# Patient Record
Sex: FEMALE
[De-identification: names, ages, dates, MRNs, and addresses within clinical notes are randomized per-mention and may not be internally consistent; named-entity substitution may affect disease eponyms.]

---

## 2017-09-15 ENCOUNTER — HOSPITAL ENCOUNTER (OUTPATIENT)
Dept: HOSPITAL 42 - ENDO | Age: 82
Discharge: HOME | End: 2017-09-15
Attending: INTERNAL MEDICINE
Payer: MEDICARE

## 2017-09-15 VITALS
HEART RATE: 74 BPM | OXYGEN SATURATION: 99 % | SYSTOLIC BLOOD PRESSURE: 138 MMHG | TEMPERATURE: 98 F | DIASTOLIC BLOOD PRESSURE: 75 MMHG

## 2017-09-15 VITALS — RESPIRATION RATE: 18 BRPM

## 2017-09-15 VITALS — BODY MASS INDEX: 36.6 KG/M2

## 2017-09-15 DIAGNOSIS — K60.2: ICD-10-CM

## 2017-09-15 DIAGNOSIS — D12.3: ICD-10-CM

## 2017-09-15 DIAGNOSIS — K64.4: ICD-10-CM

## 2017-09-15 DIAGNOSIS — K57.30: ICD-10-CM

## 2017-09-15 DIAGNOSIS — Z80.0: ICD-10-CM

## 2017-09-15 DIAGNOSIS — K64.8: ICD-10-CM

## 2017-09-15 DIAGNOSIS — D12.2: Primary | ICD-10-CM

## 2017-09-15 PROCEDURE — 45385 COLONOSCOPY W/LESION REMOVAL: CPT

## 2017-09-15 PROCEDURE — 88305 TISSUE EXAM BY PATHOLOGIST: CPT

## 2017-09-15 PROCEDURE — 45380 COLONOSCOPY AND BIOPSY: CPT

## 2017-10-21 ENCOUNTER — HOSPITAL ENCOUNTER (EMERGENCY)
Dept: HOSPITAL 42 - ED | Age: 82
Discharge: HOME | End: 2017-10-21
Payer: MEDICARE

## 2017-10-21 VITALS — RESPIRATION RATE: 19 BRPM

## 2017-10-21 VITALS
TEMPERATURE: 98.2 F | DIASTOLIC BLOOD PRESSURE: 66 MMHG | OXYGEN SATURATION: 98 % | SYSTOLIC BLOOD PRESSURE: 136 MMHG | HEART RATE: 81 BPM

## 2017-10-21 VITALS — BODY MASS INDEX: 36.6 KG/M2

## 2017-10-21 DIAGNOSIS — N30.00: Primary | ICD-10-CM

## 2017-10-21 DIAGNOSIS — D64.9: ICD-10-CM

## 2017-10-21 LAB
ALBUMIN/GLOB SERPL: 1.3 {RATIO} (ref 1.1–1.8)
ALP SERPL-CCNC: 67 U/L (ref 38–126)
ALT SERPL-CCNC: 24 U/L (ref 7–56)
AMORPH SED URNS QL MICRO: (no result)
APPEARANCE UR: (no result)
APTT BLD: 19.2 SECONDS (ref 25.1–36.5)
AST SERPL-CCNC: 36 U/L (ref 14–36)
BACTERIA #/AREA URNS HPF: (no result) /[HPF]
BASOPHILS # BLD AUTO: 0.04 K/MM3 (ref 0–2)
BASOPHILS NFR BLD: 0.6 % (ref 0–3)
BILIRUB SERPL-MCNC: 0.5 MG/DL (ref 0.2–1.3)
BILIRUB UR-MCNC: NEGATIVE MG/DL
BUN SERPL-MCNC: 22 MG/DL (ref 7–21)
CALCIUM SERPL-MCNC: 9 MG/DL (ref 8.4–10.5)
CHLORIDE SERPL-SCNC: 100 MMOL/L (ref 98–107)
CO2 SERPL-SCNC: 25 MMOL/L (ref 21–33)
EOSINOPHIL # BLD: 0.2 10*3/UL (ref 0–0.7)
EOSINOPHIL NFR BLD: 3.8 % (ref 1.5–5)
EPI CELLS #/AREA URNS HPF: (no result) /HPF (ref 0–5)
ERYTHROCYTE [DISTWIDTH] IN BLOOD BY AUTOMATED COUNT: 15.1 % (ref 11.5–14.5)
GLOBULIN SER-MCNC: 3.2 GM/DL
GLUCOSE SERPL-MCNC: 198 MG/DL (ref 70–110)
GLUCOSE UR STRIP-MCNC: NEGATIVE MG/DL
GRANULOCYTES # BLD: 4.53 10*3/UL (ref 1.4–6.5)
GRANULOCYTES NFR BLD: 70.7 % (ref 50–68)
HCT VFR BLD CALC: 31.1 % (ref 36–48)
INR PPP: 1.05 (ref 0.93–1.08)
KETONES UR STRIP-MCNC: NEGATIVE MG/DL
LEUKOCYTE ESTERASE UR-ACNC: (no result) LEU/UL
LYMPHOCYTES # BLD: 1.1 10*3/UL (ref 1.2–3.4)
LYMPHOCYTES NFR BLD AUTO: 16.9 % (ref 22–35)
MCH RBC QN AUTO: 30.8 PG (ref 25–35)
MCHC RBC AUTO-ENTMCNC: 33.4 G/DL (ref 31–37)
MCV RBC AUTO: 92 FL (ref 80–105)
MONOCYTES # BLD AUTO: 0.5 10*3/UL (ref 0.1–0.6)
MONOCYTES NFR BLD: 8 % (ref 1–6)
PH UR STRIP: 6 [PH] (ref 4.7–8)
PLATELET # BLD: 134 10^3/UL (ref 120–450)
PMV BLD AUTO: 11.9 FL (ref 7–11)
POTASSIUM SERPL-SCNC: 3.5 MMOL/L (ref 3.6–5)
PROT SERPL-MCNC: 7.4 G/DL (ref 5.8–8.3)
PROT UR STRIP-MCNC: 30 MG/DL
RBC # UR STRIP: (no result) /UL
SODIUM SERPL-SCNC: 141 MMOL/L (ref 132–148)
SP GR UR STRIP: 1.02 (ref 1–1.03)
TROPONIN I SERPL-MCNC: 0.02 NG/ML
UROBILINOGEN UR STRIP-ACNC: 0.2 E.U./DL
WBC # BLD AUTO: 6.4 10^3/UL (ref 4.5–11)

## 2017-10-21 PROCEDURE — 96367 TX/PROPH/DG ADDL SEQ IV INF: CPT

## 2017-10-21 PROCEDURE — 85730 THROMBOPLASTIN TIME PARTIAL: CPT

## 2017-10-21 PROCEDURE — 83615 LACTATE (LD) (LDH) ENZYME: CPT

## 2017-10-21 PROCEDURE — 71010: CPT

## 2017-10-21 PROCEDURE — 85025 COMPLETE CBC W/AUTO DIFF WBC: CPT

## 2017-10-21 PROCEDURE — 84484 ASSAY OF TROPONIN QUANT: CPT

## 2017-10-21 PROCEDURE — 96366 THER/PROPH/DIAG IV INF ADDON: CPT

## 2017-10-21 PROCEDURE — 82550 ASSAY OF CK (CPK): CPT

## 2017-10-21 PROCEDURE — 80053 COMPREHEN METABOLIC PANEL: CPT

## 2017-10-21 PROCEDURE — 87086 URINE CULTURE/COLONY COUNT: CPT

## 2017-10-21 PROCEDURE — 83880 ASSAY OF NATRIURETIC PEPTIDE: CPT

## 2017-10-21 PROCEDURE — 93005 ELECTROCARDIOGRAM TRACING: CPT

## 2017-10-21 PROCEDURE — 81001 URINALYSIS AUTO W/SCOPE: CPT

## 2017-10-21 PROCEDURE — 96365 THER/PROPH/DIAG IV INF INIT: CPT

## 2017-10-21 PROCEDURE — 99285 EMERGENCY DEPT VISIT HI MDM: CPT

## 2017-10-21 PROCEDURE — 85610 PROTHROMBIN TIME: CPT

## 2017-10-21 NOTE — ED PDOC
Arrival/HPI





- General


Chief Complaint: Female Genitourinary


Time Seen by Provider: 10/21/17 17:05


Historian: Patient, Family (daughter)





- History of Present Illness


Narrative History of Present Illness (Text): 





10/21/17 17:46


Stella Ibanez is a 81 year old female, whose past medical history includes 

hypertension, CHF, Hemorrhoids, CVA, Diabetes, and lower pedal edema presents 

to the emergency room with daughter c/o rectal bleeding in the morning, and 2 

days history MELCHOR and SOB.


Patient stated she has seen Dr. Liao, and DR. Ramirez for hemorrhoids, but 

she was told no intervention needed at that time.   Patient has an appointment 

to see Dr. Jurado General surgeon for October 30th.


Denies CP, dizziness, hemoptysis, melena, n/v/d, or abnormal gait.  


Time/Duration: Other (See HPI)


Context: Home





Past Medical History





- Provider Review


Nursing Documentation Reviewed: Yes





- Past History


Past History: Non-Contributing





- Infectious Disease


Hx of Infectious Diseases: None





- Tetanus Immunization


Tetanus Immunization: Unknown





- Reproductive


Menopause: Yes





- Past Medical History


Past Medical History: No Previous





- Cardiac


Hx Pacemaker: No





- Pulmonary


Hx Respiratory Disorders: Yes


Hx Bronchitis: Yes (dx 2-21-17 given medications)





- Neurological


Hx Paralysis: No





- HEENT


Hx HEENT Disorder: Yes (uses glasses)


Hx Cataracts: Yes (sx)


Hx Glaucoma: Yes





- Renal


Hx Renal Disorder: No





- Endocrine/Metabolic


Hx Diabetes Mellitus Type 1: Yes


Hx Hypothyroidism: Yes





- Hematological/Oncological


Hx Blood Transfusions: No





- Integumentary


Hx Dermatological Disorder: No





- Musculoskeletal/Rheumatological


Hx Musculoskeletal Disorders: Yes





- Gastrointestinal


Hx Gastrointestinal Disorders: Yes (IRRITABLE BOWEL SYNDROME,H/O CHOLECYSTECTOMY

)





- Genitourinary/Gynecological


Hx Genitourinary Disorders: Yes (frequent urination)





- Psychiatric


Hx Emotional Abuse: No


Hx Physical Abuse: No


Hx Substance Use: No





- Past Surgical History


Past Surgical History: Unable to Obtain





- Surgical History


Hx Appendectomy: Yes


Hx Cholecystectomy: Yes





- Anesthesia


Hx Anesthesia: Yes


Hx Anesthesia Reactions: No


Hx Malignant Hyperthermia: No





- Suicidal Assessment


Feels Threatened In Home Enviroment: No





Family/Social History





- Physician Review


Nursing Documentation Reviewed: Yes


Family/Social History: Other (Noncontributory)


Smoking Status: Never Smoked


Hx Alcohol Use: No


Hx Substance Use: No


Hx Substance Use Treatment: No





Allergies/Home Meds


Allergies/Adverse Reactions: 


Allergies





hydromorphone HCl [From Dilaudid] Allergy (Severe, Verified 10/21/17 17:35)


 HALLUCINATIONS


tramadol Allergy (Severe, Verified 08/25/17 10:38)


 RASH








Home Medications: 


 Home Meds











 Medication  Instructions  Recorded  Confirmed


 


Clopidogrel [Plavix] 75 mg PO DAILY 07/05/16 10/21/17


 


Folic Acid 1 mg PO DAILY 07/05/16 10/21/17


 


Furosemide [Lasix] 40 mg PO TID 07/05/16 10/21/17


 


Meclizine [Meclizine*] 25 mg PO BID PRN 07/05/16 10/21/17


 


metFORMIN [glucOPHAGE] 1,000 mg PO BID 07/05/16 10/21/17


 


Levothyroxine [Synthroid] 25 mcg PO DAILY 02/21/17 10/21/17


 


Cyanocobalamin [Vitamin B12 1000 1,000 mcg PO DAILY 08/25/17 10/21/17





mcg Tab]   


 


Glimepiride [amaRYL] 4 mg PO BID 08/25/17 10/21/17


 


Hydrocortisone [Procto-Med Hc] 30 gm RC BID 08/25/17 10/21/17


 


Omega-3-Acid Ethyl Esters [OMEGA 3] 1 tab PO BID 08/25/17 10/21/17














Review of Systems





- Review of Systems


Constitutional: Normal.  absent: Fatigue, Weight Change, Fevers


Eyes: Normal


ENT: Normal.  absent: Sore Throat, Rhinorrhea


Respiratory: SOB.  absent: Cough, Sputum, Wheezing


Cardiovascular: Edema (chronic b/l lower leg edema), MELCHOR.  absent: Chest Pain, 

Palpitations, Calf Pain, Orthopnea, Syncope


Gastrointestinal: Other (rectal bleeding).  absent: Abdominal Pain, Nausea, 

Vomiting


Genitourinary Female: Normal.  absent: Dysuria, Frequency, Hematuria


Musculoskeletal: Normal


Skin: Normal.  absent: Rash


Neurological: Normal.  absent: Headache, Dizziness, Focal Weakness


Endocrine: Normal


Hemo/Lymphatic: Normal


Psychiatric: Normal





Physical Exam


Vital Signs











  Temp Pulse Resp BP Pulse Ox


 


 10/21/17 19:25  99.1 F    


 


 10/21/17 19:09   86  19  152/70 H  99


 


 10/21/17 17:06  99.1 F  88  19  137/60  98











Temperature: Afebrile


Blood Pressure: Normal


Pulse: Regular


Respiratory Rate: Normal


Appearance: Positive for: Well-Appearing, Non-Toxic, Comfortable


Pain Distress: None


Mental Status: Positive for: Alert and Oriented X 3





- Systems Exam


Head: Present: Atraumatic, Normocephalic


Pupils: Present: PERRL


Extroacular Muscles: Present: EOMI


Conjunctiva: Present: Normal


Mouth: Present: Moist Mucous Membranes


Neck: Present: Normal Range of Motion


Respiratory/Chest: Present: Clear to Auscultation, Good Air Exchange.  No: 

Respiratory Distress, Accessory Muscle Use, Wheezes, Retracting, Rhonchi


Cardiovascular: Present: Regular Rate and Rhythm, Normal S1, S2.  No: Murmurs


Abdomen: Present: Normal Bowel Sounds.  No: Tenderness, Distention, Peritoneal 

Signs


Rectal: Present: Hemorrhoids (with superficial abrasion), Normal Rectal Tone, 

Other (GUAIC negative with positive control).  No: Occult Blood, Rectal 

Tenderness, Gross Blood, Melena, Nodule/Mass/Lesions


Back: Present: Normal Inspection.  No: CVA Tenderness


Upper Extremity: Present: Normal Inspection.  No: Cyanosis, Edema


Lower Extremity: Present: Normal Inspection, NORMAL PULSES, Normal ROM, Narda's 

Sign, Neurovascularly Intact, Capillary Refill < 2 s.  No: Edema, CALF 

TENDERNESS


Neurological: Present: GCS=15, CN II-XII Intact, Speech Normal


Skin: Present: Warm, Dry, Normal Color.  No: Rashes


Psychiatric: Present: Alert, Oriented x 3, Normal Insight, Normal Concentration





Medical Decision Making


ED Course and Treatment: 





10/21/17 20:05


Patient came c/o one episode of rectal bleeding, bright red blood.  She also 

noted increased sob x 2 days.  Patient remained stable during the course of ED 

visit.  VS are WNL.  Labs were unremarkable, except for UTI.   Patient was 

treated with rocephine and fluids.


10/21/17 20:07


Dr. Stein reviewed labs, CXR, and he examined patient.  Dr. Stein agreed 

with my plan to d/c home, and Rocephin IV.


I paged Dr. Reilly.


10/21/17 20:39


I spoke with Dr. Reilly regarding patient history of rectal bleeding, and SOB.  

I reviewed labs with Dr. Reilly.  He stated he scheduled to see patient in 1-2 

days.  He agrees with plan for d/c home.


Patient feels well in her normal state of health.  She agrees with the plan for 

d/c home.


Re-evaluation Time: 20:45


Reassessment Condition: Re-examined, Improved





- Lab Interpretations


Lab Results: 








 10/21/17 18:00 





 10/21/17 18:00 





 Lab Results





10/21/17 18:59: Urine Color yellow, Urine Appearance Slight-cloudy, Urine pH 6.0

, Ur Specific Gravity 1.025, Urine Protein 30 H, Urine Glucose (UA) Negative, 

Urine Ketones Negative, Urine Blood Trace-intact H, Urine Nitrate Positive H, 

Urine Bilirubin Negative, Urine Urobilinogen 0.2, Ur Leukocyte Esterase 

Moderate H, Urine RBC 2 - 5, Urine WBC 10 - 15, Ur Epithelial Cells Many, 

Amorphous Sediment Moderate, Urine Bacteria Small


10/21/17 18:00: Sodium 141, Potassium 3.5 L, Chloride 100, Carbon Dioxide 25, 

Anion Gap 20, BUN 22 H, Creatinine 0.9, Est GFR (African Amer) > 60, Est GFR (

Non-Af Amer) > 60, Random Glucose 198 H, Calcium 9.0, Total Bilirubin 0.5, AST 

36, ALT 24, Alkaline Phosphatase 67, Lactate Dehydrogenase 495, Total Creatine 

Kinase 36, Troponin I 0.02, NT-Pro-B Natriuret Pep 211, Total Protein 7.4, 

Albumin 4.2, Globulin 3.2, Albumin/Globulin Ratio 1.3


10/21/17 18:00: PT 11.5, INR 1.05, APTT 19.2 L


10/21/17 18:00: WBC 6.4, RBC 3.38 L, Hgb 10.4 L, Hct 31.1 L, MCV 92.0, MCH 30.8

, MCHC 33.4, RDW 15.1 H, Plt Count 134, MPV 11.9 H, Gran % 70.7 H, Lymph % (Auto

) 16.9 L, Mono % (Auto) 8.0 H, Eos % (Auto) 3.8, Baso % (Auto) 0.6, Gran # 4.53

, Lymph # 1.1 L, Mono # 0.5, Eos # 0.2, Baso # 0.04








I have reviewed the lab results: Yes


Interpretation: Abnormal lab values (acute cystitis, mild anemia)





- RAD Interpretation


Narrative RAD Interpretations (Text): 





10/21/17 19:07


CXR:  enlarged heart.  No infiltrates


Radiology Orders: 








10/21/17 17:44


CHEST PORTABLE [RAD] Stat 














- EKG Interpretation


Interpreted by ED Physician: Yes (sinus rhythm with 1stdegree AV block with PAC)


Type: 12 lead EKG


Comparison: No previous EKG avail.





- Medication Orders


Current Medication Orders: 








Pantoprazole Sodium (Protonix 40mg Ivpb)  40 mg in 100 mls @ 20 mls/hr IVPB 

.Q5H REBEKAH


   Last Admin: 10/21/17 18:55  Dose: 20 mls/hr





eMAR Start Stop


 Document     10/21/17 18:55  LA  (Rec: 10/21/17 19:02  LA  [id number])


     Intravenous Solution


      Start Date                                 10/21/17


      Start Time                                 19:01








Discontinued Medications





Ceftriaxone Sodium (Rocephin 1 Gram Ivpb)  1 gm in 100 mls @ 200 mls/hr IVPB 

STAT STA


   PRN Reason: Protocol


   Stop: 10/21/17 20:32


Sodium Chloride (Sodium Chloride 0.9%)  500 mls @ 999 mls/hr IV .Q31M STA


   Stop: 10/21/17 20:37











Disposition/Present on Arrival





- Present on Arrival


Any Indicators Present on Arrival: No


History of DVT/PE: No


History of Uncontrolled Diabetes: No


Urinary Catheter: No


History of Decub. Ulcer: No


History Surgical Site Infection Following: None





- Disposition


Have Diagnosis and Disposition been Completed?: Yes


Diagnosis: 


 Acute cystitis, Anemia, mild





Disposition: HOME/ ROUTINE


Disposition Time: 20:47


Patient Plan: Discharge


Patient Problems: 


 Current Active Problems











Problem Status Onset


 


Acute cystitis Acute  


 


Anemia, mild Acute  











Condition: IMPROVED


Discharge Instructions (ExitCare):  Urinary Tract Infection in Women (ED)


Additional Instructions: 


Call private doctor for follow up visit in 1-2 days.  Continue with Sitz bath, 

and Procto-Med, supp. as  recommended by your doctor.  Take medication as 

instructed.  Return to emergency if symptoms worsen.


Prescriptions: 


Cephalexin [Keflex] 500 mg PO BID #14 capsule


Referrals: 


Guero Reilly DO [Primary Care Provider] - Follow up with primary


Forms:  [employer] (English)

## 2017-10-22 NOTE — CARD
--------------- APPROVED REPORT --------------





EKG Measurement

Heart [id number]

[id number]

[id number]

[id number]

[id number]



<Conclusion>

Sinus rhythm with 1st degree AV block with premature atrial complexes

Nonspecific T wave abnormality

Prolonged QT

Abnormal ECG

## 2017-10-22 NOTE — RAD
HISTORY:

MELCHOR  



COMPARISON:

08/25/2017 



FINDINGS:



LUNGS:

No active pulmonary disease.



PLEURA:

No significant pleural effusion identified, no pneumothorax apparent.



CARDIOVASCULAR:

Mild cardiomegaly



OSSEOUS STRUCTURES:

No significant abnormalities.



VISUALIZED UPPER ABDOMEN:

Normal.



OTHER FINDINGS:

None.



IMPRESSION:

No active disease.

## 2017-11-09 ENCOUNTER — HOSPITAL ENCOUNTER (INPATIENT)
Dept: HOSPITAL 42 - ED | Age: 82
LOS: 3 days | Discharge: NURSING, REHAB OR LONG-TERM CARE | DRG: 872 | End: 2017-11-12
Attending: FAMILY MEDICINE | Admitting: FAMILY MEDICINE
Payer: MEDICARE

## 2017-11-09 VITALS — BODY MASS INDEX: 36.6 KG/M2

## 2017-11-09 DIAGNOSIS — I11.0: ICD-10-CM

## 2017-11-09 DIAGNOSIS — H53.40: ICD-10-CM

## 2017-11-09 DIAGNOSIS — H40.9: ICD-10-CM

## 2017-11-09 DIAGNOSIS — I50.9: ICD-10-CM

## 2017-11-09 DIAGNOSIS — Z79.02: ICD-10-CM

## 2017-11-09 DIAGNOSIS — E66.9: ICD-10-CM

## 2017-11-09 DIAGNOSIS — E66.01: ICD-10-CM

## 2017-11-09 DIAGNOSIS — B96.1: ICD-10-CM

## 2017-11-09 DIAGNOSIS — K21.9: ICD-10-CM

## 2017-11-09 DIAGNOSIS — E11.65: ICD-10-CM

## 2017-11-09 DIAGNOSIS — Z90.49: ICD-10-CM

## 2017-11-09 DIAGNOSIS — A41.9: Primary | ICD-10-CM

## 2017-11-09 DIAGNOSIS — E11.42: ICD-10-CM

## 2017-11-09 DIAGNOSIS — M48.00: ICD-10-CM

## 2017-11-09 DIAGNOSIS — Z79.899: ICD-10-CM

## 2017-11-09 DIAGNOSIS — N39.0: ICD-10-CM

## 2017-11-09 DIAGNOSIS — E78.00: ICD-10-CM

## 2017-11-09 DIAGNOSIS — E03.9: ICD-10-CM

## 2017-11-09 DIAGNOSIS — M48.061: ICD-10-CM

## 2017-11-09 DIAGNOSIS — Z86.73: ICD-10-CM

## 2017-11-09 LAB
ALBUMIN/GLOB SERPL: 1.3 {RATIO} (ref 1.1–1.8)
ALP SERPL-CCNC: 64 U/L (ref 38–126)
ALT SERPL-CCNC: 21 U/L (ref 7–56)
APPEARANCE UR: (no result)
APTT BLD: 30.9 SECONDS (ref 25.1–36.5)
AST SERPL-CCNC: 30 U/L (ref 14–36)
BACTERIA #/AREA URNS HPF: (no result) /[HPF]
BASE EXCESS BLDV CALC-SCNC: 5.8 MMOL/L (ref 0–2)
BASE EXCESS BLDV CALC-SCNC: 6.6 MMOL/L (ref 0–2)
BASOPHILS # BLD AUTO: 0.03 K/MM3 (ref 0–2)
BASOPHILS NFR BLD: 0.3 % (ref 0–3)
BILIRUB SERPL-MCNC: 0.6 MG/DL (ref 0.2–1.3)
BILIRUB UR-MCNC: NEGATIVE MG/DL
BUN SERPL-MCNC: 16 MG/DL (ref 7–21)
CALCIUM SERPL-MCNC: 9 MG/DL (ref 8.4–10.5)
CHLORIDE SERPL-SCNC: 96 MMOL/L (ref 98–107)
CHOLEST SERPL-MCNC: 155 MG/DL (ref 130–200)
CO2 SERPL-SCNC: 30 MMOL/L (ref 21–33)
COLOR UR: YELLOW
EOSINOPHIL # BLD: 0.1 10*3/UL (ref 0–0.7)
EOSINOPHIL NFR BLD: 1.2 % (ref 1.5–5)
ERYTHROCYTE [DISTWIDTH] IN BLOOD BY AUTOMATED COUNT: 14.9 % (ref 11.5–14.5)
ERYTHROCYTE [SEDIMENTATION RATE] IN BLOOD: 79 MM/HR (ref 0–20)
GLOBULIN SER-MCNC: 3.1 GM/DL
GLUCOSE SERPL-MCNC: 166 MG/DL (ref 70–110)
GLUCOSE UR STRIP-MCNC: NEGATIVE MG/DL
GRANULOCYTES # BLD: 7.08 10*3/UL (ref 1.4–6.5)
GRANULOCYTES NFR BLD: 76.4 % (ref 50–68)
HCT VFR BLD CALC: 32.4 % (ref 36–48)
INR PPP: 1.16 (ref 0.93–1.08)
KETONES UR STRIP-MCNC: NEGATIVE MG/DL
LEUKOCYTE ESTERASE UR-ACNC: (no result) LEU/UL
LYMPHOCYTES # BLD: 1.5 10*3/UL (ref 1.2–3.4)
LYMPHOCYTES NFR BLD AUTO: 16.3 % (ref 22–35)
MAGNESIUM SERPL-MCNC: 1 MG/DL (ref 1.7–2.2)
MCH RBC QN AUTO: 30.7 PG (ref 25–35)
MCHC RBC AUTO-ENTMCNC: 33.6 G/DL (ref 31–37)
MCV RBC AUTO: 91.3 FL (ref 80–105)
MONOCYTES # BLD AUTO: 0.5 10*3/UL (ref 0.1–0.6)
MONOCYTES NFR BLD: 5.8 % (ref 1–6)
PH BLDV: 7.46 [PH] (ref 7.32–7.43)
PH BLDV: 7.48 [PH] (ref 7.32–7.43)
PH UR STRIP: 6 [PH] (ref 4.7–8)
PHOSPHATE SERPL-MCNC: 2.3 MG/DL (ref 2.5–4.5)
PLATELET # BLD: 210 10^3/UL (ref 120–450)
PMV BLD AUTO: 10.1 FL (ref 7–11)
POTASSIUM SERPL-SCNC: 3 MMOL/L (ref 3.6–5)
PROT SERPL-MCNC: 7.1 G/DL (ref 5.8–8.3)
PROT UR STRIP-MCNC: 30 MG/DL
RBC # UR STRIP: NEGATIVE /UL
SODIUM SERPL-SCNC: 137 MMOL/L (ref 132–148)
SP GR UR STRIP: 1.01 (ref 1–1.03)
TROPONIN I SERPL-MCNC: 0.02 NG/ML
UROBILINOGEN UR STRIP-ACNC: 0.2 E.U./DL
WBC # BLD AUTO: 9.3 10^3/UL (ref 4.5–11)

## 2017-11-09 RX ADMIN — INSULIN HUMAN SCH: 100 INJECTION, SOLUTION PARENTERAL at 23:30

## 2017-11-09 NOTE — PCM.SEPTIC
Sepsis Progress Note





- Reassessment Type


Date of Evaluation: 11/09/17


Time of Evaluation: 22:57


Reassessment Type: Non-invasive reassessment





- Non Invasive Reassessment


Were the most recent vital sign reviewed: Yes


Vital Sign (Latest): 


 











Temp Pulse Resp BP Pulse Ox


 


 101.8 F H  89   16   143/66   97 


 


 11/09/17 18:13  11/09/17 19:45  11/09/17 19:45  11/09/17 19:45  11/09/17 19:45








Cardiovascular: Yes: Regular Rate, Rhythm


Respiratory: Yes: Normal Breath Sounds


Capillary Refill: Normal (Less than 2 sec)


Skin: Normal Color

## 2017-11-09 NOTE — CT
EXAM:

  CT Head Without Intravenous Contrast



EXAM DATE/TIME:

  11/9/2017 5:42 PM



CLINICAL HISTORY:

  81 years old, female; Signs and symptoms; Other: Confusion



TECHNIQUE:

  Axial computed tomography images of the head/brain without intravenous 

contrast.  All CT scans at this facility use one or more dose reduction 

techniques, viz.: automated exposure control; ma/kV adjustment per patient size 

(including targeted exams where dose is matched to indication; i.e. head); or 

iterative reconstruction technique.



COMPARISON:

  CT - HEAD W/O CONTRAST 2017-02-21 22:51



FINDINGS:

  Brain:  There is dilatation of sulci gyri and ventricles. There is no midline 

shift. There is decreased attenuation in periventricular white matter. There is 

geographic region of decreased attenuation in the posterior right temporal and 

right occipital lobes. There is an old left frontal infarct. There is an old 

left occipital infarct with encephalomalacia. There are old basal ganglia 

lacunar infarcts. There are no focal masses. There are no focal hemorrhages. 

Gray-white differentiation is visualized.

  Ventricles:  See above

  Bones/joints:  Bones: Cranial vault is intact.

  Soft tissues:  unremarkable

  Sinuses:  There is no acute sinusitis. There are retention cysts/polyp in the 

right maxillary sinus, left frontal sinus and ethmoid air cell.. There is a 

small retention cyst in an ethmoid air cell. There is mild mucoperiosteal

  Mastoid air cells:  Ears and mastoids: Middle ears and mastoids are 

unremarkable.

  Orbits:  Orbital contents are unremarkable.



IMPRESSION: Age indeterminate ischemic change posterior right temporal and 

right occipital lobes; old left frontal, left occipital and basal ganglia 

infarcts , no bleed   





MRI may be helpful if acute infarct is suspected

## 2017-11-09 NOTE — ED PDOC
Arrival/HPI





- General


Chief Complaint: Altered Mental Status


Time Seen by Provider: 11/09/17 17:27


Historian: Patient





- History of Present Illness


Narrative History of Present Illness (Text): 


11/09/17 17:41


A 81 year old female, with a past medical history of CHF, hypertension, diabetes

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 

for confusion since 11:00 this morning. Patient reports she has been forgetting 

things today, not feeling like herself. Daughter notes patient is warm to touch 

with chills. She notes a sharp right sided headache since yesterday. Patient 

reports chronic left lower leg weakness, which has worsened over the past 3-4 

days. She also notes chronic nasal congestion, cough and loose bowel movements, 

but denies any nausea, vomiting, abdominal pain, chest pain, shortness of breath

, vision changes or any other complaints.





PMD: Dr. Reilly





Time/Duration: Other (confusion x this morning)


Symptom Course: Unchanged


Context: Home





Past Medical History





- Provider Review


Nursing Documentation Reviewed: Yes





- Past History


Past History: Non-Contributing





- Infectious Disease


Hx of Infectious Diseases: None





- Tetanus Immunization


Tetanus Immunization: Unknown





- Reproductive


Menopause: Yes





- Past Medical History


Past Medical History: No Previous





- Cardiac


Hx Pacemaker: No





- Pulmonary


Hx Respiratory Disorders: Yes


Hx Bronchitis: Yes (dx 2-21-17 given medications)





- Neurological


Hx Paralysis: No





- HEENT


Hx HEENT Disorder: Yes (uses glasses)


Hx Cataracts: Yes (sx)


Hx Glaucoma: Yes





- Renal


Hx Renal Disorder: No





- Endocrine/Metabolic


Hx Diabetes Mellitus Type 1: Yes


Hx Hypothyroidism: Yes





- Hematological/Oncological


Hx Blood Transfusions: No





- Integumentary


Hx Dermatological Disorder: No





- Musculoskeletal/Rheumatological


Hx Musculoskeletal Disorders: Yes





- Gastrointestinal


Hx Gastrointestinal Disorders: Yes (IRRITABLE BOWEL SYNDROME,H/O CHOLECYSTECTOMY

)





- Genitourinary/Gynecological


Hx Genitourinary Disorders: Yes (frequent urination)





- Psychiatric


Hx Emotional Abuse: No


Hx Physical Abuse: No


Hx Substance Use: No





- Past Surgical History


Past Surgical History: Unable to Obtain





- Surgical History


Hx Appendectomy: Yes


Hx Cholecystectomy: Yes





- Anesthesia


Hx Anesthesia: Yes


Hx Anesthesia Reactions: No


Hx Malignant Hyperthermia: No





- Suicidal Assessment


Feels Threatened In Home Enviroment: No





Family/Social History





- Physician Review


Nursing Documentation Reviewed: Yes


Family/Social History: No Known Family HX


Smoking Status: Never Smoked


Hx Alcohol Use: No


Hx Substance Use: No


Hx Substance Use Treatment: No





Allergies/Home Meds


Allergies/Adverse Reactions: 


Allergies





hydromorphone HCl [From Dilaudid] Allergy (Severe, Verified 11/09/17 17:16)


 HALLUCINATIONS


tramadol Allergy (Severe, Verified 11/09/17 17:16)


 RASH








Home Medications: 


 Home Meds











 Medication  Instructions  Recorded  Confirmed


 


Clopidogrel [Plavix] 75 mg PO DAILY 07/05/16 11/09/17


 


Folic Acid 1 mg PO DAILY 07/05/16 11/09/17


 


Furosemide [Lasix] 40 mg PO TID 07/05/16 11/09/17


 


Meclizine [Meclizine*] 25 mg PO BID PRN 07/05/16 11/09/17


 


metFORMIN [glucOPHAGE] 1,000 mg PO BID 07/05/16 11/09/17


 


Levothyroxine [Synthroid] 25 mcg PO DAILY 02/21/17 11/09/17


 


Cyanocobalamin [Vitamin B12 1000 1,000 mcg PO DAILY 08/25/17 11/09/17





mcg Tab]   


 


Glimepiride [amaRYL] 4 mg PO BID 08/25/17 11/09/17


 


Hydrocortisone [Procto-Med Hc] 30 gm RC BID 08/25/17 11/09/17


 


Omega-3-Acid Ethyl Esters [OMEGA 3] 1 tab PO BID 08/25/17 11/09/17


 


Amoxicillin [Amoxil 500 mg Cap] 500 mg PO TID 11/09/17 11/09/17














Review of Systems





- Physician Review


All systems were reviewed & negative as marked: Yes





- Review of Systems


Constitutional: Night Sweats


Eyes: absent: Vision Changes


ENT: Sinus Congestion


Respiratory: Cough.  absent: SOB


Cardiovascular: absent: Chest Pain


Gastrointestinal: Stool Changes (loose stool).  absent: Abdominal Pain, Nausea, 

Vomiting


Neurological: Headache, Other (LLE weakness)


Psychiatric: Other (confusion)





Physical Exam


Vital Signs Reviewed: Yes


Vital Signs











  Temp Pulse Resp BP Pulse Ox


 


 11/09/17 19:45   89  16  143/66  97


 


 11/09/17 19:25   90  20  129/63  96


 


 11/09/17 18:13  101.8 F H  96 H  18  140/61  97


 


 11/09/17 18:10  101.8 F H    


 


 11/09/17 17:19  100.3 F H  86  19  142/49 L  97


 


 11/09/17 17:18  100.3 F H  86  17  142/49 L  97











Temperature: Febrile


Blood Pressure: Hypotensive


Pulse: Regular


Respiratory Rate: Normal


Appearance: Positive for: Well-Appearing, Non-Toxic, Comfortable


Pain Distress: None


Mental Status: Positive for: Alert and Oriented X 3





- Systems Exam


Head: Present: Atraumatic, Normocephalic


Pupils: Present: PERRL


Extroacular Muscles: Present: EOMI


Conjunctiva: Present: Normal


Mouth: Present: Moist Mucous Membranes


Neck: Present: Normal Range of Motion


Respiratory/Chest: Present: Clear to Auscultation, Good Air Exchange.  No: 

Respiratory Distress, Accessory Muscle Use


Cardiovascular: Present: Regular Rate and Rhythm, Normal S1, S2.  No: Murmurs


Abdomen: Present: Normal Bowel Sounds.  No: Tenderness, Distention, Peritoneal 

Signs


Back: Present: Normal Inspection


Upper Extremity: Present: Normal Inspection.  No: Cyanosis, Edema


Lower Extremity: Present: Edema (bilateral trace edema), NORMAL PULSES, Other (

Left lower leg weakness, 4/5 in strength).  No: CALF TENDERNESS


Neurological: Present: GCS=15, CN II-XII Intact, Speech Normal, Other


Skin: Present: Warm (warm to touch), Dry, Normal Color.  No: Rashes


Psychiatric: Present: Alert, Oriented x 3, Normal Insight, Normal Concentration

, Other (confused)





Medical Decision Making


ED Course and Treatment: 


11/09/17 17:41


Impression:


A 81 year old female brought in for confusion





Differential Diagnosis included but are not limited to: Sepsis vs. CVA





Plan:


-- CT Head 


-- Chest X-ray


-- EKG


-- Labs 


-- Blood and Urine culture


-- Urinalysis 


-- Tylenol, Magnesium sulfate, Potassium chloride, IV fluids and Zosyn


-- Reassess and disposition





Progress Notes:


EKG shows NSR at 89 BPM with 1st degree AV block, sinus arrhythmia. Interpreted 

by me.





11/09/17 18:00


Chest xray read and interpreted by me, shows no active disease.





Report Date: 11/09/17 19:53 


EXAM: CT Head Without Intravenous Contrast  


Dictated By:  Bertha Brower MD 


IMPRESSION: Age indeterminate ischemic change posterior right temporal and 

right occipital lobes; old left frontal, left occipital and basal ganglia 

infarcts , no bleed. MRI may be helpful if acute infarct is suspected  


 


11/09/17 19:12


Case was discussed with Dr. Reilly who agreed to place the patient on telemetry 

for sepsis. 





11/09/17 20:12


CT results ischemic changes as noted above. Patient passed her swallow study as 

per ADALID Cantor. Aspirin PO ordered. Dr. Reilly informed about new finding on CT.  

He is requested Dr. Pablo. 





11/09/17 22:08


Cased discussed with Dr. Pablo who recommends Vancomycin IV also to cover 

possible endocarditis. He will evaluate the patient in the morning. 





- Lab Interpretations


Lab Results: 








 11/09/17 18:00 





 11/09/17 18:00 





 Lab Results





11/09/17 18:20: Influenza Typ A,B (EIA) Negative for flu a/b


11/09/17 18:00: Blood Type O POSITIVE, Antibody Screen Negative, BBK History 

Checked No verified bt


11/09/17 18:00: Hemoglobin A1c 7.8 H


11/09/17 18:00: Sodium 137, Chloride 96 L, Potassium 3.0 L, Carbon Dioxide 30, 

Anion Gap 14, BUN 16, Creatinine 0.8, Est GFR (African Amer) > 60, Est GFR (Non-

Af Amer) > 60, Random Glucose 166 H, Calcium 9.0, Phosphorus 2.3 L, Magnesium 

1.0 L*, Total Bilirubin 0.6, AST 30, ALT 21, Alkaline Phosphatase 64, Troponin 

I 0.02, NT-Pro-B Natriuret Pep 774 H, Total Protein 7.1, Albumin 4.0, Globulin 

3.1, Albumin/Globulin Ratio 1.3, Triglycerides 429 H, Cholesterol 155, LDL 

Cholesterol Direct 48, HDL Cholesterol 33


11/09/17 18:00: pO2 36, VBG pH 7.46 H, VBG pCO2 44.0, VBG HCO3 31.3 H, VBG 

Total CO2 32.7 H, VBG O2 Sat (Calc) 74.6 H, VBG Base Excess 6.6 H, VBG 

Potassium 3.0 L, Sodium 139.0, Chloride 99.0, Glucose 166 H, Lactate 3.7 H, 

FiO2 21.0, Venous Blood Potassium 3.0 L


11/09/17 18:00: PT 12.7 H, INR 1.16 H, APTT 30.9


11/09/17 18:00: Procalcitonin 0.05 L


11/09/17 18:00: WBC 9.3  D, RBC 3.55, Hgb 10.9 L, Hct 32.4 L, MCV 91.3, MCH 30.7

, MCHC 33.6, RDW 14.9 H, Plt Count 210, MPV 10.1, Gran % 76.4 H, Lymph % (Auto) 

16.3 L, Mono % (Auto) 5.8, Eos % (Auto) 1.2 L, Baso % (Auto) 0.3, Gran # 7.08 H

, Lymph # 1.5, Mono # 0.5, Eos # 0.1, Baso # 0.03, ESR 79 H








I have reviewed the lab results: Yes





- RAD Interpretation


Radiology Orders: 








11/09/17 17:40


CHEST PORTABLE [RAD] Stat 





11/09/17 17:42


HEAD W/O CONTRAST [CT] Stat 














- Medication Orders


Current Medication Orders: 








Clopidogrel Bisulfate (Plavix)  75 mg PO DAILY REBEKAH


Furosemide (Lasix)  40 mg IVP DAILY REBEKAH


Gabapentin (Neurontin)  300 mg PO TID REBEKAH


   PRN Reason: Protocol


Glimepiride (Amaryl)  4 mg PO BID REBEKAH


Sodium Chloride (Sodium Chloride 0.45%)  1,000 mls @ 40 mls/hr IV .Q24H REBEKAH


Vancomycin HCl (Vancomycin 1gm)  1 gm in 250 mls @ 167 mls/hr IVPB STAT STA


   PRN Reason: Protocol


   Stop: 11/09/17 23:36


Insulin Human Regular (Humulin R Med)  0 units SC ACHS REBEKAH


   PRN Reason: Protocol


Levothyroxine Sodium (Synthroid)  25 mcg PO DAILY REBEKAH


Metformin HCl (Glucophage)  1,000 mg PO BID REBEKAH





Discontinued Medications





Acetaminophen (Tylenol 325mg Tab)  650 mg PO STAT STA


   Stop: 11/09/17 17:41


   Last Admin: 11/09/17 18:10  Dose: 650 mg





MAR Pain/Vitals


 Document     11/09/17 18:10  OCS  (Rec: 11/09/17 18:18  OCS  [facility] [place])


     Pain Reassessment


      Is This A Pain ReAssessment?               Yes


     Sleep


      Is patient sleeping during reassessment?   No


     Presence of Pain


      Presence of Pain                           Yes


     Pain Scale Used


      Pain Scale Used                            Numeric


     Location


      Pain Location Body Site                    Generalized


     Vitals


      Temperature (97.6 F-99.6 F)                101.8 F


      Temperature Source                         Rectal





Aspirin (Aspirin Chewable)  324 mg PO STAT STA


   Stop: 11/09/17 20:12


   Last Admin: 11/09/17 20:27 Dose:  Not Given


   Non-Admin Reason: Patient Refused





Piperacillin Sod/Tazobactam Sod (Zosyn 4.5 Gm In Ns 100ml)  4.5 gm in 100 mls @ 

200 mls/hr IVPB STAT STA


   PRN Reason: Protocol


   Stop: 11/09/17 19:01


   Last Admin: 11/09/17 18:53  Dose: 200 mls/hr





eMAR Start Stop


 Document     11/09/17 18:53  OCS  (Rec: 11/09/17 18:53  [facility] [place] [facility]


     Intravenous Solution


      Start Date                                 11/09/17


      Start Time                                 18:53





Magnesium Sulfate 2 gm/ Sodium (Chloride)  104 mls @ 102 mls/hr IVPB ONCE ONE


   Stop: 11/09/17 19:38


   Last Admin: 11/09/17 19:47  Dose: 102 mls/hr





eMAR Start Stop


 Document     11/09/17 19:47  JOL  (Rec: 11/09/17 19:47  JOL  [facility]


     Intravenous Solution


      Start Date                                 11/09/17


      Start Time                                 19:47


      End Date                                   11/09/17


      End time                                   20:49


      Total Infusion Time                        62





Potassium Chloride 10 meq/ (Sodium Chloride)  505 mls @ 999 mls/hr IV .Q31M STA


   Stop: 11/09/17 19:09


   Last Admin: 11/09/17 20:50  Dose: 999 mls/hr





eMAR Start Stop


 Document     11/09/17 20:50  JOL  (Rec: 11/09/17 22:09  JOL  [facility]


     Intravenous Solution


      Start Date                                 11/09/17


      Start Time                                 20:55





Potassium Chloride (K-Dur 20 Meq Er Tab)  40 meq PO STAT STA


   Stop: 11/09/17 18:38


   Last Admin: 11/09/17 18:59  Dose: 40 meq











NIHSS Scale ([place])


Time Performed: 17:41





- How Severe is the Stoke


  ** Baseline


Level of Consciousness: 0=Alert


LOC to Questions: 0=Both comments correct


LOC to commands: 0=Obeys both correctly


Best Gaze: 0=Normal


Visual: 1=Partial hemianopia


Facial: 0=Normal


Motor Arm - Left: 0=No drift


Motor Arm - Right: 0=No drift


Motor Leg - Left: 0=No drift


Motor Leg - Right: 0=No drift


Limb Ataxia: 0=Absent


Sensory: 0=Normal


Best Language: 0=No aphasia


Dysarthia: 0=Normal articulation


Extinction & Inattention (Neglect): 0=Normal, no object


Score: 1


Risk Level: Minor Stroke Risk





rTPA Inclusion/Exclusion





- Refusal of Treatment


Patient Refused Treatment: No





- Inclusion Criteria for Altepase


Patient is 18 years or Older: Yes


The Clinical Diagnosis of Ischemic Stroke That is Causing a Potentially 

Disabling Neurological Deficit: No


Time of Onset is Well Established to be Less Than 270 Minute Before Treatment 

Would Begin: No


Risk/Benefit Discussed With Patient/Family Member Present: No





- Scribe Statement


The provider has reviewed the documentation as recorded by the Scribe


Julissa Gentile training under Peyton Munoz





Provider Scribe Attestation:


All medical record entries made by the Scribe were at my direction and 

personally dictated by me. I have reviewed the chart and agree that the record 

accurately reflects my personal performance of the history, physical exam, 

medical decision making, and the department course for this patient. I have 

also personally directed, reviewed, and agree with the discharge instructions 

and disposition.








Disposition/Present on Arrival





- Present on Arrival


Any Indicators Present on Arrival: No


History of DVT/PE: No


History of Uncontrolled Diabetes: No


Urinary Catheter: No


History of Decub. Ulcer: No


History Surgical Site Infection Following: None





- Disposition


Have Diagnosis and Disposition been Completed?: Yes


Diagnosis: 


 Altered mental status, Sepsis, CVA (cerebral vascular accident)





Disposition: HOSPITALIZED


Disposition Time: 22:21


Patient Plan: Admission


Condition: GUARDED

## 2017-11-10 LAB
ALBUMIN/GLOB SERPL: 1.2 {RATIO} (ref 1.1–1.8)
ALP SERPL-CCNC: 56 U/L (ref 38–126)
ALT SERPL-CCNC: 32 U/L (ref 7–56)
AST SERPL-CCNC: 21 U/L (ref 14–36)
BASE EXCESS BLDV CALC-SCNC: 2.8 MMOL/L (ref 0–2)
BASE EXCESS BLDV CALC-SCNC: 4.8 MMOL/L (ref 0–2)
BILIRUB SERPL-MCNC: 0.6 MG/DL (ref 0.2–1.3)
BUN SERPL-MCNC: 13 MG/DL (ref 7–21)
CALCIUM SERPL-MCNC: 8.6 MG/DL (ref 8.4–10.5)
CHLORIDE SERPL-SCNC: 99 MMOL/L (ref 95–110)
CO2 SERPL-SCNC: 28 MMOL/L (ref 21–33)
ERYTHROCYTE [DISTWIDTH] IN BLOOD BY AUTOMATED COUNT: 15 % (ref 11.5–14.5)
GLOBULIN SER-MCNC: 3 GM/DL
GLUCOSE SERPL-MCNC: 220 MG/DL (ref 70–110)
HCT VFR BLD CALC: 30.9 % (ref 36–48)
MAGNESIUM SERPL-MCNC: 1.5 MG/DL (ref 1.7–2.2)
MCH RBC QN AUTO: 30.2 PG (ref 25–35)
MCHC RBC AUTO-ENTMCNC: 33 G/DL (ref 31–37)
MCV RBC AUTO: 91.4 FL (ref 80–105)
PH BLDV: 7.43 [PH] (ref 7.32–7.43)
PH BLDV: 7.44 [PH] (ref 7.32–7.43)
PLATELET # BLD: 159 10^3/UL (ref 120–450)
PMV BLD AUTO: 9.7 FL (ref 7–11)
POTASSIUM SERPL-SCNC: 3.2 MMOL/L (ref 3.6–5)
PROT SERPL-MCNC: 6.5 G/DL (ref 5.8–8.3)
SODIUM SERPL-SCNC: 137 MMOL/L (ref 132–148)
WBC # BLD AUTO: 8 10^3/UL (ref 4.5–11)

## 2017-11-10 RX ADMIN — MEROPENEM SCH MLS/HR: 1 INJECTION INTRAVENOUS at 03:24

## 2017-11-10 RX ADMIN — VANCOMYCIN HYDROCHLORIDE SCH MLS/HR: 1 INJECTION, POWDER, LYOPHILIZED, FOR SOLUTION INTRAVENOUS at 11:02

## 2017-11-10 RX ADMIN — INSULIN HUMAN SCH UNITS: 100 INJECTION, SOLUTION PARENTERAL at 08:03

## 2017-11-10 RX ADMIN — INSULIN HUMAN SCH: 100 INJECTION, SOLUTION PARENTERAL at 23:03

## 2017-11-10 RX ADMIN — MEROPENEM SCH MLS/HR: 1 INJECTION INTRAVENOUS at 14:24

## 2017-11-10 RX ADMIN — VANCOMYCIN HYDROCHLORIDE SCH MLS/HR: 1 INJECTION, POWDER, LYOPHILIZED, FOR SOLUTION INTRAVENOUS at 22:57

## 2017-11-10 RX ADMIN — MEROPENEM SCH MLS/HR: 1 INJECTION INTRAVENOUS at 06:40

## 2017-11-10 RX ADMIN — MEROPENEM SCH MLS/HR: 1 INJECTION INTRAVENOUS at 21:45

## 2017-11-10 RX ADMIN — INSULIN HUMAN SCH UNITS: 100 INJECTION, SOLUTION PARENTERAL at 11:30

## 2017-11-10 RX ADMIN — INSULIN HUMAN SCH: 100 INJECTION, SOLUTION PARENTERAL at 16:23

## 2017-11-10 NOTE — MRI
PROCEDURE:  MRI BRAIN WITHOUT CONTRAST



HISTORY:

r/o CVA



COMPARISON:

Comparison is made to the previous study dated 09/16/2014 



TECHNIQUE:

Multiplanar, multisequence MR images of the brain were obtained 

without intravenous contrast enhancement.



FINDINGS:



HEMORRHAGE:

None



DWI:

There is a focal diffusion restriction at the right occipital lobe 

suggestive of acute infarction.



BRAIN PARENCHYMA:

Again seen is focal encephalomalacia at the left frontal lobe 

surrounding with edema. 



There is interval appearance of focal hyperintense T2 and FLAIR 

signal at the medial aspect of the right cerebellum since the 

previous exam may represent focal encephalomalacia. No evidence of 

mass effect or midline shift atrophy and mild chronic microvascular 

ischemic disease are again noted.



VENTRICLES:

Unremarkable. No hydrocephalus.



CRANIUM:

Unremarkable.



ORBITS:

Grossly unremarkable.



PARANASAL SINUSES/MASTOIDS:

Again seen is mucosal retention cyst at the right maxillary sinus.



VASCULAR SYSTEM:

Skull base flow voids intact.



OTHER FINDINGS:

None. 



IMPRESSION:

Diffusion restriction at the right occipital lobe suggestive of acute 

infarction.



Re- demonstration of focal encephalomalacia at the left frontal lobe 

surrounding with mild edema.



Interval appearance of new focal hyperintense T2 and FLAIR signal at 

the medial aspect of the right cerebellum may represent small old 

infarction/ encephalomalacia.

## 2017-11-10 NOTE — RAD
HISTORY:

Sepsis Patient  



COMPARISON:

10/21/2017 



FINDINGS:



LUNGS:

No active pulmonary disease.



PLEURA:

No significant pleural effusion identified, no pneumothorax apparent.



CARDIOVASCULAR:

Mild cardiomegaly



OSSEOUS STRUCTURES:

No significant abnormalities.



VISUALIZED UPPER ABDOMEN:

Normal.



OTHER FINDINGS:

None.



IMPRESSION:

No active disease.

## 2017-11-10 NOTE — CP.PCM.HP
History of Present Illness





- History of Present Illness


History of Present Illness: 


Neurology consult note for Dr. Pablo's service - MATT Lozano PGY2





HPI: Patient is a 81 year-old female with past medical history of CHF, 

hypertension, DM type 2, hypothyroidism, glaucoma, spinal stenosis at L3-L4, 

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 

anterior frontal lobe with no residual effects who presented to [facility] [place] c/o confusion associated with fevers and chills. She reported that she 

first started having symptoms earlier that day and became worried she may be 

having a stroke. Her daughter at the time noted that she seemed confused, 

forgetful and had chills. She also reported loose bowel movements which she 

admits had been a chronic issue. She denied chest pain, palpitations, SOB, 

abdominal pain, nausea, vomiting, cough, focal weakness, numbness, tingling. 

Neurology consulted for evaluation of confusion.





12point ROS as per HPI above otherwise negative


PMH: as stated above


PSH: cholecystectomy


Allergies: hydromorphone, tramadol


Family Hx: Non-contributory


Social Hx: Denies tobacco, alcohol and illicit drug use


PMD: Dr. Reilly





Present on Admission





- Present on Admission


Any Indicators Present on Admission: No





Past Patient History





- Infectious Disease


Hx of Infectious Diseases: None





- Tetanus Immunizations


Tetanus Immunization: Unknown





- Past Social History


Smoking Status: Never Smoked





- CARDIAC


Hx Cardiac Disorders: Yes


Hx Congestive Heart Failure: Yes


Hx Hypercholesterolemia: Yes


Hx Hypertension: Yes





- PULMONARY


Hx Respiratory Disorders: Yes


Hx Bronchitis: Yes





- NEUROLOGICAL


Hx Neurological Disorder: Yes


HX Cerebrovascular Accident: Yes (no deficits)


Hx Transient Ischemic Attacks (TIA): Yes





- HEENT


Hx HEENT Problems: Yes (uses glasses)


Hx Cataracts: Yes (sx)


Hx Glaucoma: Yes





- RENAL


Hx Chronic Kidney Disease: No





- ENDOCRINE/METABOLIC


Hx Endocrine Disorders: Yes


Hx Diabetes Mellitus Type 2: Yes


Hx Hypothyroidism: Yes





- HEMATOLOGICAL/ONCOLOGICAL


Hx Blood Transfusions: No





- INTEGUMENTARY


Hx Dermatological Problems: No





- MUSCULOSKELETAL/RHEUMATOLOGICAL


Hx Falls: No





- GASTROINTESTINAL


Hx Gastrointestinal Disorders: Yes (IBS)





- GENITOURINARY/GYNECOLOGICAL


Hx Genitourinary Disorders: Yes (frequent urination)





- PSYCHIATRIC


Hx Emotional Abuse: No


Hx Physical Abuse: No


Hx Substance Use: No





- SURGICAL HISTORY


Hx Surgeries: Yes


Hx Appendectomy: Yes


Hx Cholecystectomy: Yes





- ANESTHESIA


Hx Anesthesia: Yes


Hx Anesthesia Reactions: No


Hx Malignant Hyperthermia: No





Meds


Allergies/Adverse Reactions: 


 Allergies











Allergy/AdvReac Type Severity Reaction Status Date / Time


 


hydromorphone HCl Allergy Severe HALLUCINATI Verified 11/09/17 17:16





[From Dilaudid]   ONS  


 


tramadol Allergy Severe RASH Verified 11/09/17 17:16














Physical Exam





- Constitutional


Appears: Non-toxic, No Acute Distress





- Head Exam


Head Exam: ATRAUMATIC, NORMAL INSPECTION, NORMOCEPHALIC





- Eye Exam


Eye Exam: EOMI, PERRL





- ENT Exam


ENT Exam: Mucous Membranes Moist





- Neck Exam


Neck exam: Positive for: Normal Inspection





- Respiratory Exam


Respiratory Exam: Clear to Auscultation Bilateral.  absent: Rales, Rhonchi, 

Wheezes





- Cardiovascular Exam


Cardiovascular Exam: RRR, +S1, +S2, Systolic Murmur.  absent: Gallop, JVD, Rubs





- GI/Abdominal Exam


GI & Abdominal Exam: Distended, Soft.  absent: Firm, Guarding, Rebound, 

Tenderness





- Extremities Exam


Extremities exam: Positive for: normal inspection





- Neurological Exam


Neurological exam: Alert, CN II-XII Intact, Oriented x3


Additional comments: 


awake, alert, oriented x3


EOMI


PERRL


motor function grossly intact bilaterally


sensory intact throughout


no drift


proprioception in tact


babinski downward going bilaterally


gait deferred





- Psychiatric Exam


Psychiatric exam: Normal Affect, Normal Mood





- Skin


Skin Exam: Dry, Intact, Normal Color, Warm





Results





- Vital Signs


Recent Vital Signs: 





 Last Vital Signs











Temp  99.1 F   11/10/17 07:07


 


Pulse  83   11/10/17 06:00


 


Resp  20   11/10/17 06:00


 


BP  136/68   11/10/17 06:00


 


Pulse Ox  96   11/10/17 00:01














- Labs


Result Diagrams: 


 11/10/17 06:00





 11/10/17 06:00


Labs: 





 Laboratory Results - last 24 hr











  11/09/17 11/09/17 11/10/17





  19:30 22:24 06:00


 


WBC    8.0


 


RBC    3.38 L


 


Hgb    10.2 L


 


Hct    30.9 L


 


MCV    91.4


 


MCH    30.2


 


MCHC    33.0


 


RDW    15.0 H


 


Plt Count    159


 


MPV    9.7


 


pO2   203 H 


 


VBG pH   7.48 H 


 


VBG pCO2   40.0 


 


VBG HCO3   29.8 H 


 


VBG Total CO2   31.0 H 


 


VBG O2 Sat (Calc)   98.9 H 


 


VBG Base Excess   5.8 H 


 


VBG Potassium   2.9 L 


 


Sodium   138.0 


 


Chloride   103.0 


 


Glucose   144 H 


 


Lactate   2.6 H 


 


FiO2   21.0 


 


Potassium   


 


Carbon Dioxide   


 


Anion Gap   


 


BUN   


 


Creatinine   


 


Est GFR ( Amer)   


 


Est GFR (Non-Af Amer)   


 


Random Glucose   


 


Calcium   


 


Magnesium   


 


Total Bilirubin   


 


AST   


 


ALT   


 


Alkaline Phosphatase   


 


Total Protein   


 


Albumin   


 


Globulin   


 


Albumin/Globulin Ratio   


 


Venous Blood Potassium   2.9 L 


 


Urine Color  Yellow  


 


Urine Appearance  Slight-cloudy  


 


Urine pH  6.0  


 


Ur Specific Gravity  1.015  


 


Urine Protein  30 H  


 


Urine Glucose (UA)  Negative  


 


Urine Ketones  Negative  


 


Urine Blood  Negative  


 


Urine Nitrate  Positive H  


 


Urine Bilirubin  Negative  


 


Urine Urobilinogen  0.2  


 


Ur Leukocyte Esterase  Small H  


 


Urine RBC  1 - 3  


 


Urine WBC  10 - 15  


 


Ur Epithelial Cells  10 - 12  


 


Urine Bacteria  Mod  














  11/10/17 11/10/17 11/10/17





  06:00 06:00 09:40


 


WBC   


 


RBC   


 


Hgb   


 


Hct   


 


MCV   


 


MCH   


 


MCHC   


 


RDW   


 


Plt Count   


 


MPV   


 


pO2   32  44


 


VBG pH   7.43  7.44 H


 


VBG pCO2   45.0  40.0


 


VBG HCO3   29.9 H  27.2


 


VBG Total CO2   31.3 H  28.4 H


 


VBG O2 Sat (Calc)   69.8 H  82.7 H


 


VBG Base Excess   4.8 H  2.8 H


 


VBG Potassium   3.0 L  2.8 L


 


Sodium  137  138.0  136.0


 


Chloride  99  102.0  99.0


 


Glucose   228 H  250 H


 


Lactate   2.6 H  4.3 H*


 


FiO2   21.0  21.0


 


Potassium  3.2 L  


 


Carbon Dioxide  28  


 


Anion Gap  13  


 


BUN  13  


 


Creatinine  0.8  


 


Est GFR ( Amer)  > 60  


 


Est GFR (Non-Af Amer)  > 60  


 


Random Glucose  220 H  


 


Calcium  8.6  


 


Magnesium  1.5 L  


 


Total Bilirubin  0.6  


 


AST  21  


 


ALT  32  


 


Alkaline Phosphatase  56  


 


Total Protein  6.5  


 


Albumin  3.5  


 


Globulin  3.0  


 


Albumin/Globulin Ratio  1.2  


 


Venous Blood Potassium   3.0 L  2.8 L


 


Urine Color   


 


Urine Appearance   


 


Urine pH   


 


Ur Specific Gravity   


 


Urine Protein   


 


Urine Glucose (UA)   


 


Urine Ketones   


 


Urine Blood   


 


Urine Nitrate   


 


Urine Bilirubin   


 


Urine Urobilinogen   


 


Ur Leukocyte Esterase   


 


Urine RBC   


 


Urine WBC   


 


Ur Epithelial Cells   


 


Urine Bacteria   














Assessment & Plan





- Assessment and Plan (Free Text)


Plan: 


82yo female with history of DM, HTN, GERD, Osteoarthritis, acute infarct in the 

left anterior frontal lobe with no residual effects presents c/o confusion 

associated with fevers and chills. Neurology consulted for evaluation of 

confusion and rule out potential CVA/TIA.





1. Sepsis secondary to UTI


2. Confusion


3. DM type2


4. HTN


5. GERD


6. Osteoarthritis





-Confusion likely secondary to sepsis due to urinary tract infection. 

Urinalysis was notable for nitrates, leukocyte esterase, WBC's and bacteria. 

Lactate on admission was notable at 3.7 and patient had a Tmax of 101.8F. 


-At this time, recommend follow up urine culture and infectious disease 

recommendations. Continue IV antibiotics and IVF hydration.


-Monitor and replete electrolytes as indicated


-Physical therapy evaluation


-Further recommendations to follow; will discuss with Dr. Pablo whether 

further imaging notable MRI is warranted.


-Head CT reviewed; revealed old left frontal, left occipital and basal ganglia 

infarcts; age-indeterminate ischemic changes in posterior right temporal and 

occipital lobes; please see full report














- Date & Time


Date: 11/10/17


Time: 10:17

## 2017-11-10 NOTE — CARD
--------------- APPROVED REPORT --------------





EKG Measurement

Heart [id number]

CA [id number]

[id number]

[id number]

[id number]



<Conclusion>

Sinus rhythm with PACs.

Poor R Progression V1-V4.

Non Specific ST_T Changes.

## 2017-11-10 NOTE — CP.PCM.CON
<Carlito Lozano - Last Filed: 11/10/17 12:06>





History of Present Illness





- History of Present Illness


History of Present Illness: 


Neurology consult note for Dr. Pablo's service - MATT Lozano PGY2





HPI: Patient is a 81 year-old female with past medical history of CHF, 

hypertension, DM type 2, hypothyroidism, glaucoma, spinal stenosis at L3-L4, 

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 

anterior frontal lobe with no residual effects who presented to [facility] c/o confusion associated with fevers and chills. She reported that she 

first started having symptoms earlier that day and became worried she may be 

having a stroke. Her daughter at the time noted that she seemed confused, 

forgetful and had chills. She also reported loose bowel movements which she 

admits had been a chronic issue. She denied chest pain, palpitations, SOB, 

abdominal pain, nausea, vomiting, cough, focal weakness, numbness, tingling. 

Neurology consulted for evaluation of confusion/rule out CVA. 





12point ROS as per HPI above otherwise negative


PMH: as stated above


PSH: cholecystectomy


Allergies: hydromorphone, tramadol


Family Hx: Non-contributory


Social Hx: Denies tobacco, alcohol and illicit drug use


PMD: Dr. Reilly





Past Patient History





- Infectious Disease


Hx of Infectious Diseases: None





- Tetanus Immunizations


Tetanus Immunization: Unknown





- Past Social History


Smoking Status: Never Smoked





- CARDIAC


Hx Cardiac Disorders: Yes


Hx Congestive Heart Failure: Yes


Hx Hypercholesterolemia: Yes


Hx Hypertension: Yes





- PULMONARY


Hx Respiratory Disorders: Yes


Hx Bronchitis: Yes





- NEUROLOGICAL


Hx Neurological Disorder: Yes


HX Cerebrovascular Accident: Yes (no deficits)


Hx Transient Ischemic Attacks (TIA): Yes





- HEENT


Hx HEENT Problems: Yes (uses glasses)


Hx Cataracts: Yes (sx)


Hx Glaucoma: Yes





- RENAL


Hx Chronic Kidney Disease: No





- ENDOCRINE/METABOLIC


Hx Endocrine Disorders: Yes


Hx Diabetes Mellitus Type 2: Yes


Hx Hypothyroidism: Yes





- HEMATOLOGICAL/ONCOLOGICAL


Hx Blood Transfusions: No





- INTEGUMENTARY


Hx Dermatological Problems: No





- MUSCULOSKELETAL/RHEUMATOLOGICAL


Hx Falls: No





- GASTROINTESTINAL


Hx Gastrointestinal Disorders: Yes (IBS)





- GENITOURINARY/GYNECOLOGICAL


Hx Genitourinary Disorders: Yes (frequent urination)





- PSYCHIATRIC


Hx Emotional Abuse: No


Hx Physical Abuse: No


Hx Substance Use: No





- SURGICAL HISTORY


Hx Surgeries: Yes


Hx Appendectomy: Yes


Hx Cholecystectomy: Yes





- ANESTHESIA


Hx Anesthesia: Yes


Hx Anesthesia Reactions: No


Hx Malignant Hyperthermia: No





Meds


Allergies/Adverse Reactions: 


 Allergies











Allergy/AdvReac Type Severity Reaction Status Date / Time


 


hydromorphone HCl Allergy Severe HALLUCINATI Verified 11/09/17 17:16





[From Dilaudid]   ONS  


 


tramadol Allergy Severe RASH Verified 11/09/17 17:16














- Medications


Medications: 


 Current Medications





Clopidogrel Bisulfate (Plavix)  75 mg PO DAILY [facility]


   Last Admin: 11/10/17 10:59 Dose:  75 mg


Furosemide (Lasix)  40 mg IVP DAILY [facility]


   Last Admin: 11/10/17 10:59 Dose:  40 mg


Gabapentin (Neurontin)  300 mg PO TID [facility]


   PRN Reason: Protocol


   Last Admin: 11/10/17 10:59 Dose:  300 mg


Glimepiride (Amaryl)  4 mg PO BID [facility]


   Last Admin: 11/10/17 10:59 Dose:  4 mg


Sodium Chloride (Sodium Chloride 0.45%)  1,000 mls @ 40 mls/hr IV .Q24H [facility]


   Last Admin: 11/10/17 03:25 Dose:  40 mls/hr


Meropenem 1 gm/ Dextrose  100 mls @ 100 mls/hr IVPB Q8 [facility]


   PRN Reason: Protocol


   Stop: 11/16/17 23:46


   Last Admin: 11/10/17 06:40 Dose:  100 mls/hr


Vancomycin HCl (Vancomycin 1gm)  1 gm in 250 mls @ 167 mls/hr IVPB Q12H [facility]


   PRN Reason: Protocol


   Last Admin: 11/10/17 11:02 Dose:  167 mls/hr


Insulin Human Regular (Humulin R Med)  0 units SC ACHS [facility]


   PRN Reason: Protocol


   Last Admin: 11/10/17 11:30 Dose:  3 units


Levothyroxine Sodium (Synthroid)  25 mcg PO DAILY [facility]


   Last Admin: 11/10/17 10:59 Dose:  25 mcg


Metformin HCl (Glucophage)  1,000 mg PO BID [facility]


   Last Admin: 11/10/17 11:02 Dose:  1,000 mg











Physical Exam





- Constitutional


Appears: Non-toxic, No Acute Distress





- Head Exam


Head Exam: ATRAUMATIC, NORMOCEPHALIC





- Eye Exam


Eye Exam: EOMI, PERRL





- ENT Exam


ENT Exam: Mucous Membranes Moist





- Neck Exam


Neck exam: Positive for: Normal Inspection.  Negative for: Lymphadenopathy, 

Tenderness, Thyromegaly





- Respiratory Exam


Respiratory Exam: Clear to Auscultation Bilateral.  absent: Rales, Rhonchi, 

Wheezes





- Cardiovascular Exam


Cardiovascular Exam: RRR, +S1, +S2, Systolic Murmur.  absent: Gallop, JVD, Rubs





- GI/Abdominal Exam


GI & Abdominal Exam: Distended, Soft.  absent: Firm, Guarding, Rebound, 

Tenderness





- Extremities Exam


Extremities exam: Positive for: normal inspection





- Neurological Exam


Neurological exam: Alert, CN II-XII Intact, Oriented x3


Additional comments: 


awake, alert, oriented x3


EOMI


PERRL


motor function grossly intact bilaterally


sensory intact throughout


no drift


proprioception in tact


babinski downward going bilaterally


gait deferred








- Psychiatric Exam


Psychiatric exam: Normal Affect, Normal Mood





- Skin


Skin Exam: Dry, Intact, Normal Color, Warm





Results





- Vital Signs


Recent Vital Signs: 


 Last Vital Signs











Temp  99.1 F   11/10/17 07:07


 


Pulse  78   11/10/17 10:00


 


Resp  20   11/10/17 06:00


 


BP  132/65   11/10/17 10:59


 


Pulse Ox  96   11/10/17 00:01














- Labs


Result Diagrams: 


 11/10/17 06:00





 11/10/17 06:00


Labs: 


 Laboratory Results - last 24 hr











  11/09/17 11/09/17 11/10/17





  19:30 22:24 06:00


 


WBC    8.0


 


RBC    3.38 L


 


Hgb    10.2 L


 


Hct    30.9 L


 


MCV    91.4


 


MCH    30.2


 


MCHC    33.0


 


RDW    15.0 H


 


Plt Count    159


 


MPV    9.7


 


pO2   203 H 


 


VBG pH   7.48 H 


 


VBG pCO2   40.0 


 


VBG HCO3   29.8 H 


 


VBG Total CO2   31.0 H 


 


VBG O2 Sat (Calc)   98.9 H 


 


VBG Base Excess   5.8 H 


 


VBG Potassium   2.9 L 


 


Sodium   138.0 


 


Chloride   103.0 


 


Glucose   144 H 


 


Lactate   2.6 H 


 


FiO2   21.0 


 


Potassium   


 


Carbon Dioxide   


 


Anion Gap   


 


BUN   


 


Creatinine   


 


Est GFR ( Amer)   


 


Est GFR (Non-Af Amer)   


 


Random Glucose   


 


Calcium   


 


Magnesium   


 


Total Bilirubin   


 


AST   


 


ALT   


 


Alkaline Phosphatase   


 


Total Protein   


 


Albumin   


 


Globulin   


 


Albumin/Globulin Ratio   


 


Venous Blood Potassium   2.9 L 


 


Urine Color  Yellow  


 


Urine Appearance  Slight-cloudy  


 


Urine pH  6.0  


 


Ur Specific Gravity  1.015  


 


Urine Protein  30 H  


 


Urine Glucose (UA)  Negative  


 


Urine Ketones  Negative  


 


Urine Blood  Negative  


 


Urine Nitrate  Positive H  


 


Urine Bilirubin  Negative  


 


Urine Urobilinogen  0.2  


 


Ur Leukocyte Esterase  Small H  


 


Urine RBC  1 - 3  


 


Urine WBC  10 - 15  


 


Ur Epithelial Cells  10 - 12  


 


Urine Bacteria  Mod  














  11/10/17 11/10/17 11/10/17





  06:00 06:00 09:40


 


WBC   


 


RBC   


 


Hgb   


 


Hct   


 


MCV   


 


MCH   


 


MCHC   


 


RDW   


 


Plt Count   


 


MPV   


 


pO2   32  44


 


VBG pH   7.43  7.44 H


 


VBG pCO2   45.0  40.0


 


VBG HCO3   29.9 H  27.2


 


VBG Total CO2   31.3 H  28.4 H


 


VBG O2 Sat (Calc)   69.8 H  82.7 H


 


VBG Base Excess   4.8 H  2.8 H


 


VBG Potassium   3.0 L  2.8 L


 


Sodium  137  138.0  136.0


 


Chloride  99  102.0  99.0


 


Glucose   228 H  250 H


 


Lactate   2.6 H  4.3 H*


 


FiO2   21.0  21.0


 


Potassium  3.2 L  


 


Carbon Dioxide  28  


 


Anion Gap  13  


 


BUN  13  


 


Creatinine  0.8  


 


Est GFR ( Amer)  > 60  


 


Est GFR (Non-Af Amer)  > 60  


 


Random Glucose  220 H  


 


Calcium  8.6  


 


Magnesium  1.5 L  


 


Total Bilirubin  0.6  


 


AST  21  


 


ALT  32  


 


Alkaline Phosphatase  56  


 


Total Protein  6.5  


 


Albumin  3.5  


 


Globulin  3.0  


 


Albumin/Globulin Ratio  1.2  


 


Venous Blood Potassium   3.0 L  2.8 L


 


Urine Color   


 


Urine Appearance   


 


Urine pH   


 


Ur Specific Gravity   


 


Urine Protein   


 


Urine Glucose (UA)   


 


Urine Ketones   


 


Urine Blood   


 


Urine Nitrate   


 


Urine Bilirubin   


 


Urine Urobilinogen   


 


Ur Leukocyte Esterase   


 


Urine RBC   


 


Urine WBC   


 


Ur Epithelial Cells   


 


Urine Bacteria   














Assessment & Plan





- Assessment and Plan (Free Text)


Plan: 


82yo female with history of DM, HTN, GERD, Osteoarthritis, acute infarct in the 

left anterior frontal lobe with no residual effects presents c/o confusion 

associated with fevers and chills. Neurology consulted for evaluation of 

confusion and rule out potential CVA/TIA.





1. Sepsis secondary to UTI


2. Confusion


3. DM type2


4. HTN


5. GERD


6. Osteoarthritis





-Confusion likely secondary to sepsis due to urinary tract infection however we 

will obtain a brain MRI to rule out new CVA


-Urinalysis was notable for nitrates, leukocyte esterase, WBC's and bacteria; 

Lactate on admission was notable at 3.7 and patient had a Tmax of 101.8F,


-At this time, recommend follow up urine culture and infectious disease 

recommendations


-Continue IV antibiotics and IVF hydration


-Monitor and replete electrolytes as indicated


-Physical therapy evaluation


-Maintain blood glucose between 140-180


-Maintain systolic blood pressure between 120-130


-Continue Gabapentin 300mg PO TID for neuropathic pain 


-Continue Plavix 75mg PO daily for stroke prevention


-Continue present medical management as per primary team


-Head CT reviewed; revealed old left frontal, left occipital and basal ganglia 

infarcts; age-indeterminate ischemic changes in posterior right temporal and 

occipital lobes; please see full report





Patient seen and case discussed/reviewed with attending, Dr. Pablo














<Chandan Pablo - Last Filed: 11/10/17 12:30>





Meds





- Medications


Medications: 


 Current Medications





Clopidogrel Bisulfate (Plavix)  75 mg PO DAILY [facility]


   Last Admin: 11/10/17 10:59 Dose:  75 mg


Furosemide (Lasix)  40 mg IVP DAILY [facility]


   Last Admin: 11/10/17 10:59 Dose:  40 mg


Gabapentin (Neurontin)  300 mg PO TID [facility]


   PRN Reason: Protocol


   Last Admin: 11/10/17 10:59 Dose:  300 mg


Glimepiride (Amaryl)  4 mg PO BID [facility]


   Last Admin: 11/10/17 10:59 Dose:  4 mg


Sodium Chloride (Sodium Chloride 0.45%)  1,000 mls @ 40 mls/hr IV .Q24H [facility]


   Last Admin: 11/10/17 03:25 Dose:  40 mls/hr


Meropenem 1 gm/ Dextrose  100 mls @ 100 mls/hr IVPB Q8 [facility]


   PRN Reason: Protocol


   Stop: 11/16/17 23:46


   Last Admin: 11/10/17 06:40 Dose:  100 mls/hr


Vancomycin HCl (Vancomycin 1gm)  1 gm in 250 mls @ 167 mls/hr IVPB Q12H [facility]


   PRN Reason: Protocol


   Last Admin: 11/10/17 11:02 Dose:  167 mls/hr


Insulin Human Regular (Humulin R Med)  0 units SC ACHS [facility]


   PRN Reason: Protocol


   Last Admin: 11/10/17 11:30 Dose:  3 units


Levothyroxine Sodium (Synthroid)  25 mcg PO DAILY [facility]


   Last Admin: 11/10/17 10:59 Dose:  25 mcg


Metformin HCl (Glucophage)  1,000 mg PO BID [facility]


   Last Admin: 11/10/17 11:02 Dose:  1,000 mg











Results





- Vital Signs


Recent Vital Signs: 


 Last Vital Signs











Temp  98.3 F   11/10/17 12:00


 


Pulse  80   11/10/17 12:00


 


Resp  18   11/10/17 12:00


 


BP  134/60   11/10/17 12:00


 


Pulse Ox  96   11/10/17 00:01














- Labs


Result Diagrams: 


 11/10/17 06:00





 11/10/17 06:00


Labs: 


 Laboratory Results - last 24 hr











  11/09/17 11/09/17 11/10/17





  19:30 22:24 06:00


 


WBC    8.0


 


RBC    3.38 L


 


Hgb    10.2 L


 


Hct    30.9 L


 


MCV    91.4


 


MCH    30.2


 


MCHC    33.0


 


RDW    15.0 H


 


Plt Count    159


 


MPV    9.7


 


pO2   203 H 


 


VBG pH   7.48 H 


 


VBG pCO2   40.0 


 


VBG HCO3   29.8 H 


 


VBG Total CO2   31.0 H 


 


VBG O2 Sat (Calc)   98.9 H 


 


VBG Base Excess   5.8 H 


 


VBG Potassium   2.9 L 


 


Sodium   138.0 


 


Chloride   103.0 


 


Glucose   144 H 


 


Lactate   2.6 H 


 


FiO2   21.0 


 


Potassium   


 


Carbon Dioxide   


 


Anion Gap   


 


BUN   


 


Creatinine   


 


Est GFR ( Amer)   


 


Est GFR (Non-Af Amer)   


 


Random Glucose   


 


Calcium   


 


Magnesium   


 


Total Bilirubin   


 


AST   


 


ALT   


 


Alkaline Phosphatase   


 


Total Protein   


 


Albumin   


 


Globulin   


 


Albumin/Globulin Ratio   


 


Venous Blood Potassium   2.9 L 


 


Urine Color  Yellow  


 


Urine Appearance  Slight-cloudy  


 


Urine pH  6.0  


 


Ur Specific Gravity  1.015  


 


Urine Protein  30 H  


 


Urine Glucose (UA)  Negative  


 


Urine Ketones  Negative  


 


Urine Blood  Negative  


 


Urine Nitrate  Positive H  


 


Urine Bilirubin  Negative  


 


Urine Urobilinogen  0.2  


 


Ur Leukocyte Esterase  Small H  


 


Urine RBC  1 - 3  


 


Urine WBC  10 - 15  


 


Ur Epithelial Cells  10 - 12  


 


Urine Bacteria  Mod  














  11/10/17 11/10/17 11/10/17





  06:00 06:00 09:40


 


WBC   


 


RBC   


 


Hgb   


 


Hct   


 


MCV   


 


MCH   


 


MCHC   


 


RDW   


 


Plt Count   


 


MPV   


 


pO2   32  44


 


VBG pH   7.43  7.44 H


 


VBG pCO2   45.0  40.0


 


VBG HCO3   29.9 H  27.2


 


VBG Total CO2   31.3 H  28.4 H


 


VBG O2 Sat (Calc)   69.8 H  82.7 H


 


VBG Base Excess   4.8 H  2.8 H


 


VBG Potassium   3.0 L  2.8 L


 


Sodium  137  138.0  136.0


 


Chloride  99  102.0  99.0


 


Glucose   228 H  250 H


 


Lactate   2.6 H  4.3 H*


 


FiO2   21.0  21.0


 


Potassium  3.2 L  


 


Carbon Dioxide  28  


 


Anion Gap  13  


 


BUN  13  


 


Creatinine  0.8  


 


Est GFR ( Amer)  > 60  


 


Est GFR (Non-Af Amer)  > 60  


 


Random Glucose  220 H  


 


Calcium  8.6  


 


Magnesium  1.5 L  


 


Total Bilirubin  0.6  


 


AST  21  


 


ALT  32  


 


Alkaline Phosphatase  56  


 


Total Protein  6.5  


 


Albumin  3.5  


 


Globulin  3.0  


 


Albumin/Globulin Ratio  1.2  


 


Venous Blood Potassium   3.0 L  2.8 L


 


Urine Color   


 


Urine Appearance   


 


Urine pH   


 


Ur Specific Gravity   


 


Urine Protein   


 


Urine Glucose (UA)   


 


Urine Ketones   


 


Urine Blood   


 


Urine Nitrate   


 


Urine Bilirubin   


 


Urine Urobilinogen   


 


Ur Leukocyte Esterase   


 


Urine RBC   


 


Urine WBC   


 


Ur Epithelial Cells   


 


Urine Bacteria   














Attending/Attestation





- Attestation


I have personally seen and examined this patient.: Yes


I have fully participated in the care of the patient.: Yes


I have reviewed all pertinent clinical information: Yes

## 2017-11-10 NOTE — CP.PCM.CON
<BalwinderJulieta - Last Filed: 11/10/17 14:12>





History of Present Illness





- History of Present Illness


History of Present Illness: 





80 y/o female known to Dr. Jacob's home visit service seen at bedside for 

painful big toes on both feet. Pt states it is tender to the touch underneath 

the nail area. Pt states she doesn't think it is the nail herself. Pt denies 

any trauma to the area. Pt denies any open wounds to the feet or toes. Pt 

denies F/C/N/V/CP/SOB. 





PMH: DM, CHF, HTN, hypothyroidism, hx of CVA


PSH: appendectomy, cholecystectomy, cataracts


All: Dilaudid, tramadol


Social: denies EtOH, cigarette or illicit drug use


Family: hx of diabetes and HTN in family





Review of Systems





- Review of Systems


All systems: reviewed and no additional remarkable complaints except (per HPI)





Past Patient History





- Infectious Disease


Hx of Infectious Diseases: None





- Tetanus Immunizations


Tetanus Immunization: Unknown





- Past Social History


Smoking Status: Never Smoked





- CARDIAC


Hx Cardiac Disorders: Yes


Hx Congestive Heart Failure: Yes


Hx Hypercholesterolemia: Yes


Hx Hypertension: Yes





- PULMONARY


Hx Respiratory Disorders: Yes


Hx Bronchitis: Yes





- NEUROLOGICAL


Hx Neurological Disorder: Yes


HX Cerebrovascular Accident: Yes (no deficits)


Hx Transient Ischemic Attacks (TIA): Yes





- HEENT


Hx HEENT Problems: Yes (uses glasses)


Hx Cataracts: Yes (sx)


Hx Glaucoma: Yes





- RENAL


Hx Chronic Kidney Disease: No





- ENDOCRINE/METABOLIC


Hx Endocrine Disorders: Yes


Hx Diabetes Mellitus Type 2: Yes


Hx Hypothyroidism: Yes





- HEMATOLOGICAL/ONCOLOGICAL


Hx Blood Transfusions: No





- INTEGUMENTARY


Hx Dermatological Problems: No





- MUSCULOSKELETAL/RHEUMATOLOGICAL


Hx Falls: No





- GASTROINTESTINAL


Hx Gastrointestinal Disorders: Yes (IBS)





- GENITOURINARY/GYNECOLOGICAL


Hx Genitourinary Disorders: Yes (frequent urination)





- PSYCHIATRIC


Hx Emotional Abuse: No


Hx Physical Abuse: No


Hx Substance Use: No





- SURGICAL HISTORY


Hx Surgeries: Yes


Hx Appendectomy: Yes


Hx Cholecystectomy: Yes





- ANESTHESIA


Hx Anesthesia: Yes


Hx Anesthesia Reactions: No


Hx Malignant Hyperthermia: No





Meds


Allergies/Adverse Reactions: 


 Allergies











Allergy/AdvReac Type Severity Reaction Status Date / Time


 


hydromorphone HCl Allergy Severe HALLUCINATI Verified 11/09/17 17:16





[From Dilaudid]   ONS  


 


tramadol Allergy Severe RASH Verified 11/09/17 17:16














- Medications


Medications: 


 Current Medications





Clopidogrel Bisulfate (Plavix)  75 mg PO DAILY [facility]


   Last Admin: 11/10/17 10:59 Dose:  75 mg


Furosemide (Lasix)  40 mg IVP DAILY [facility]


   Last Admin: 11/10/17 10:59 Dose:  40 mg


Gabapentin (Neurontin)  300 mg PO TID [facility]


   PRN Reason: Protocol


   Last Admin: 11/10/17 10:59 Dose:  300 mg


Glimepiride (Amaryl)  4 mg PO BID [facility]


   Last Admin: 11/10/17 10:59 Dose:  4 mg


Sodium Chloride (Sodium Chloride 0.45%)  1,000 mls @ 40 mls/hr IV .Q24H [facility]


   Last Admin: 11/10/17 03:25 Dose:  40 mls/hr


Meropenem 1 gm/ Dextrose  100 mls @ 100 mls/hr IVPB Q8 [facility]


   PRN Reason: Protocol


   Stop: 11/16/17 23:46


   Last Admin: 11/10/17 06:40 Dose:  100 mls/hr


Vancomycin HCl (Vancomycin 1gm)  1 gm in 250 mls @ 167 mls/hr IVPB Q12H [facility]


   PRN Reason: Protocol


   Last Admin: 11/10/17 11:02 Dose:  167 mls/hr


Insulin Human Regular (Humulin R Med)  0 units SC ACHS [facility]


   PRN Reason: Protocol


   Last Admin: 11/10/17 11:30 Dose:  3 units


Levothyroxine Sodium (Synthroid)  25 mcg PO DAILY [facility]


   Last Admin: 11/10/17 10:59 Dose:  25 mcg


Metformin HCl (Glucophage)  1,000 mg PO BID [facility]


   Last Admin: 11/10/17 11:02 Dose:  1,000 mg











Physical Exam





- Constitutional


Appears: Well, Non-toxic, No Acute Distress





- Extremities Exam


Additional comments: 


Lower extremity focused:


Vasc: DP/PT pulses are not palpable due to +3 pitting edema extending from 

tibial tuberosity distally to digits B/L; temp gradient warm to cool B/L; CFT 

delayed to all digits but present


Derm: No open lesions, no erythema, no interdigital maceration, no clinical 

suspicion of infection


Neuro: Protective sensation grossly intact B/L


Ortho: Tenderness to palpation of distal hallux B/L 











- Neurological Exam


Neurological exam: Alert, Oriented x3





- Psychiatric Exam


Psychiatric exam: Normal Affect, Normal Mood





Results





- Vital Signs


Recent Vital Signs: 


 Last Vital Signs











Temp  99.1 F   11/10/17 07:07


 


Pulse  83   11/10/17 06:00


 


Resp  20   11/10/17 06:00


 


BP  132/65   11/10/17 10:59


 


Pulse Ox  96   11/10/17 00:01














- Labs


Result Diagrams: 


 11/10/17 06:00





 11/10/17 06:00


Labs: 


 Laboratory Results - last 24 hr











  11/09/17 11/09/17 11/10/17





  19:30 22:24 06:00


 


WBC    8.0


 


RBC    3.38 L


 


Hgb    10.2 L


 


Hct    30.9 L


 


MCV    91.4


 


MCH    30.2


 


MCHC    33.0


 


RDW    15.0 H


 


Plt Count    159


 


MPV    9.7


 


pO2   203 H 


 


VBG pH   7.48 H 


 


VBG pCO2   40.0 


 


VBG HCO3   29.8 H 


 


VBG Total CO2   31.0 H 


 


VBG O2 Sat (Calc)   98.9 H 


 


VBG Base Excess   5.8 H 


 


VBG Potassium   2.9 L 


 


Sodium   138.0 


 


Chloride   103.0 


 


Glucose   144 H 


 


Lactate   2.6 H 


 


FiO2   21.0 


 


Potassium   


 


Carbon Dioxide   


 


Anion Gap   


 


BUN   


 


Creatinine   


 


Est GFR ( Amer)   


 


Est GFR (Non-Af Amer)   


 


Random Glucose   


 


Calcium   


 


Magnesium   


 


Total Bilirubin   


 


AST   


 


ALT   


 


Alkaline Phosphatase   


 


Total Protein   


 


Albumin   


 


Globulin   


 


Albumin/Globulin Ratio   


 


Venous Blood Potassium   2.9 L 


 


Urine Color  Yellow  


 


Urine Appearance  Slight-cloudy  


 


Urine pH  6.0  


 


Ur Specific Gravity  1.015  


 


Urine Protein  30 H  


 


Urine Glucose (UA)  Negative  


 


Urine Ketones  Negative  


 


Urine Blood  Negative  


 


Urine Nitrate  Positive H  


 


Urine Bilirubin  Negative  


 


Urine Urobilinogen  0.2  


 


Ur Leukocyte Esterase  Small H  


 


Urine RBC  1 - 3  


 


Urine WBC  10 - 15  


 


Ur Epithelial Cells  10 - 12  


 


Urine Bacteria  Mod  














  11/10/17 11/10/17 11/10/17





  06:00 06:00 09:40


 


WBC   


 


RBC   


 


Hgb   


 


Hct   


 


MCV   


 


MCH   


 


MCHC   


 


RDW   


 


Plt Count   


 


MPV   


 


pO2   32  44


 


VBG pH   7.43  7.44 H


 


VBG pCO2   45.0  40.0


 


VBG HCO3   29.9 H  27.2


 


VBG Total CO2   31.3 H  28.4 H


 


VBG O2 Sat (Calc)   69.8 H  82.7 H


 


VBG Base Excess   4.8 H  2.8 H


 


VBG Potassium   3.0 L  2.8 L


 


Sodium  137  138.0  136.0


 


Chloride  99  102.0  99.0


 


Glucose   228 H  250 H


 


Lactate   2.6 H  4.3 H*


 


FiO2   21.0  21.0


 


Potassium  3.2 L  


 


Carbon Dioxide  28  


 


Anion Gap  13  


 


BUN  13  


 


Creatinine  0.8  


 


Est GFR ( Amer)  > 60  


 


Est GFR (Non-Af Amer)  > 60  


 


Random Glucose  220 H  


 


Calcium  8.6  


 


Magnesium  1.5 L  


 


Total Bilirubin  0.6  


 


AST  21  


 


ALT  32  


 


Alkaline Phosphatase  56  


 


Total Protein  6.5  


 


Albumin  3.5  


 


Globulin  3.0  


 


Albumin/Globulin Ratio  1.2  


 


Venous Blood Potassium   3.0 L  2.8 L


 


Urine Color   


 


Urine Appearance   


 


Urine pH   


 


Ur Specific Gravity   


 


Urine Protein   


 


Urine Glucose (UA)   


 


Urine Ketones   


 


Urine Blood   


 


Urine Nitrate   


 


Urine Bilirubin   


 


Urine Urobilinogen   


 


Ur Leukocyte Esterase   


 


Urine RBC   


 


Urine WBC   


 


Ur Epithelial Cells   


 


Urine Bacteria   














Assessment & Plan





- Assessment and Plan (Free Text)


Assessment: 


80 y/o diabetic female seen for bilateral big toe pain





Plan: 


80 y/o female seen at bedside with attending Dr. Jacob


Chart, labs and vitals reviewed- Tmax 101.8, WBC 8.0


Ordered x-rays of B/L feet, r/o fracture or subungual exostosis


Recommended patient to continue with compression stockings at home 


Recommended patient ambulate in shoes with a wider toe box that does not put 

pressure on her big toes


Podiatry will continue to follow while in house


Thank you for this consult











<Edil Jacob - Last Filed: 11/11/17 08:29>





Meds





- Medications


Medications: 


 Current Medications





Atorvastatin Calcium (Lipitor)  10 mg PO DIN [facility]


   Last Admin: 11/10/17 21:48 Dose:  10 mg


Clopidogrel Bisulfate (Plavix)  75 mg PO DAILY [facility]


   Last Admin: 11/10/17 10:59 Dose:  75 mg


Furosemide (Lasix)  40 mg IVP DAILY [facility]


   Last Admin: 11/10/17 10:59 Dose:  40 mg


Gabapentin (Neurontin)  300 mg PO TID [facility]


   PRN Reason: Protocol


   Last Admin: 11/10/17 18:38 Dose:  300 mg


Glimepiride (Amaryl)  4 mg PO BID [facility]


   Last Admin: 11/10/17 18:38 Dose:  4 mg


Sodium Chloride (Sodium Chloride 0.45%)  1,000 mls @ 40 mls/hr IV .Q24H [facility]


   Last Admin: 11/10/17 23:02 Dose:  Not Given


Meropenem 1 gm/ Dextrose  100 mls @ 100 mls/hr IVPB Q8 [facility]


   PRN Reason: Protocol


   Stop: 11/16/17 23:46


   Last Admin: 11/11/17 05:30 Dose:  100 mls/hr


Vancomycin HCl (Vancomycin 1gm)  1 gm in 250 mls @ 167 mls/hr IVPB Q12H [facility]


   PRN Reason: Protocol


   Last Admin: 11/10/17 22:57 Dose:  167 mls/hr


Insulin Human Regular (Humulin R Med)  0 units SC ACHS [facility]


   PRN Reason: Protocol


   Last Admin: 11/10/17 23:03 Dose:  Not Given


Ketorolac Tromethamine (Toradol)  30 mg IVP Q6 PRN


   PRN Reason: Headache


   Last Admin: 11/10/17 16:06 Dose:  30 mg


Levothyroxine Sodium (Synthroid)  25 mcg PO DAILY [facility]


   Last Admin: 11/10/17 10:59 Dose:  25 mcg


Loperamide HCl (Imodium)  2 mg PO QID [facility]N


   PRN Reason: Diarrhea


   Last Admin: 11/10/17 21:46 Dose:  2 mg


Metformin HCl (Glucophage)  1,000 mg PO BID [facility]


   Last Admin: 11/10/17 18:39 Dose:  1,000 mg











Results





- Vital Signs


Recent Vital Signs: 


 Last Vital Signs











Temp  98.4 F   11/11/17 06:00


 


Pulse  73   11/11/17 06:00


 


Resp  19   11/11/17 06:00


 


BP  142/66   11/11/17 06:00


 


Pulse Ox  97   11/11/17 06:00














- Labs


Result Diagrams: 


 11/11/17 06:00





 11/11/17 06:00


Labs: 


 Laboratory Results - last 24 hr











  11/10/17 11/11/17 11/11/17





  09:40 06:00 06:00


 


WBC   8.3 


 


RBC   3.13 L 


 


Hgb   9.4 L 


 


Hct   28.8 L 


 


MCV   92.0 


 


MCH   30.0 


 


MCHC   32.6 


 


RDW   15.2 H 


 


Plt Count   153 


 


MPV   9.6 


 


pO2  44  


 


VBG pH  7.44 H  


 


VBG pCO2  40.0  


 


VBG HCO3  27.2  


 


VBG Total CO2  28.4 H  


 


VBG O2 Sat (Calc)  82.7 H  


 


VBG Base Excess  2.8 H  


 


VBG Potassium  2.8 L  


 


Sodium  136.0   136


 


Chloride  99.0   99


 


Glucose  250 H  


 


Lactate  4.3 H*  


 


FiO2  21.0  


 


Potassium    2.7 L*


 


Carbon Dioxide    28


 


Anion Gap    12


 


BUN    14


 


Creatinine    0.9


 


Est GFR ( Amer)    > 60


 


Est GFR (Non-Af Amer)    > 60


 


Random Glucose    96


 


Calcium    8.6


 


Magnesium    1.6 L


 


Total Bilirubin    0.6


 


AST    24


 


ALT    26


 


Alkaline Phosphatase    51


 


Total Protein    6.3


 


Albumin    3.4


 


Globulin    2.9


 


Albumin/Globulin Ratio    1.2


 


Venous Blood Potassium  2.8 L  














Attending/Attestation





- Attestation


I have personally seen and examined this patient.: Yes


I have fully participated in the care of the patient.: Yes


I have reviewed all pertinent clinical information: Yes

## 2017-11-10 NOTE — HP
HISTORY OF PRESENT ILLNESS:  I know Stella very well from house calls,

over the past few years, she comes in accompanied by her daughter.  An

81-year-old female with a little bit of confusion, since 11:00 a.m.

forgetting things, not feeling herself.  She is also very warm to touch and

chills, also headache, leg weakness worsened over the past 3 to 4 days,

chronic nasal congestion, cough, loose bowel movements.  No nausea or

vomiting.  No chest pain or shortness of breath.



PAST MEDICAL HISTORY:  She has a past medical history of CHF, hypertension,

diabetes, CVA in the past with no residual effects, also bleeding

hemorrhoids, and bronchitis.  She wears glasses.  She had cataract surgery,

glaucoma, hypothyroidism, type 1 diabetes, IVS, and history of

cholecystectomy, increasing urination, appendectomy and cholecystectomy.



FAMILY HISTORY:  Hypertension and diabetes in the family.



SOCIAL HISTORY:  She never smoked.  No alcohol.  No drugs.



ALLERGIES:  SHE IS ALLERGIC TO DILAUDID AND TRAMADOL.



MEDICATIONS:  She takes Plavix, folic acid, Lasix, Meclizine, Glucophage,

Synthroid, vitamin B12, Amaryl, Proctosol cream, omega-3 fatty acid.  She

has been on amoxicillin recently.



REVIEW OF SYSTEMS:  She is very hard to touch, she is very warm, sweaty. 

She has chills.  No changes in vision or hearing, sinus congestion, a

little sore throat.  There is a cough.  No shortness of breath.  No chest

pain.  Loose stools recently.  No abdominal pain.  No nausea or vomiting or

constipation.  There is a headache.  Some weakness in the legs.  She is

confused a little bit, but she knew me right off the bat.



PHYSICAL EXAMINATION:

GENERAL:  She is resting in bed with family present.  She is a little toxic

to me not herself, well-appearing, but a little bit confused She is

alert and oriented x2.

VITAL SIGNS:  Temperature 101.8, 96 pulse, 20 respiratory rate, 140/61

blood pressure and 97% O2 saturation on room air.

HEENT:  Head is atraumatic and normocephalic.  Tongue is midline. 

Extraocular muscles intact.  Pupils equal and reactive to light and

accommodation.  Very hard to touch.

NECK:  Supple.

HEART:  Regular rate.  Normal S1 and S2.

LUNGS:  Decreased breath sounds.  Clear to auscultation, fair exchange of

oxygen.

ABDOMEN:  Soft, obese and nontender.  Positive bowel sounds.  No guarding. 

No rebound.  No CVA tenderness.

EXTREMITIES:  +1/4 pitting edema bilateral extremities.  They found some

left lower lobe weakness.

NEUROLOGIC:  GCS is 15.  Cranial nerves II through XII grossly intact. 

Speech is normal.  She is alert and oriented x2, but confused.

SKIN:  Warm, hot, not sweaty.



LABORATORY DATA:  She had multiple tests done, negative influenza.  Sodium

137, potassium 3, replaced potassium, BUN 16, creatinine 0.8.  GFR is

greater than 60.  Sugar is 166, she will be on a medicine plus insulin

coverage, phosphorus 2.3, magnesium 1, magnesium replaced, total bili is

0.6, AST is 30, ALT is 21,alk phos 64, troponin I 0.02.  BNP is 774, total

protein 7.1, albumin is 4, globulin is 3.1, cholesterol is 155, HDL of 33. 

She has a lactate of 3.7, INR is 1.16 with 11.3 white count, 10.9

hemoglobin, 32.4 hematocrit with 210 platelets.  She had a CT scan of the

head, which showed age-indeterminate ischemic change, posterior right

temporal and right occipital lobes, old left frontal lobe.  We recommended

an MRI.  Her urine was moderate bacteria.



She could have UTI, sepsis  with a temperature of 101.8 and with the

change in mentation and confusion may be she is developing a stroke versus

ischemia.  I am going to call Neurology, Infectious Disease, IV

antibiotics, IV fluids.  She is on Zosyn.  We will check her labs tomorrow,

oxygen.





__________________________________________

Guero Reilly DO



DD:  11/09/2017 21:01:08

DT:  11/09/2017 23:03:11

Job # [id number]

DAREK

## 2017-11-10 NOTE — CP.PCM.CON
History of Present Illness





- History of Present Illness


History of Present Illness: 


81 year old female with PMH of DM, chronic CHF, hypothyroidism, history of CVA, 

HTN, S/P appendectomy, S/P cholecystectomy, cataracts, morbid obesity with BMI 

41 was brought in by family for apparent confusion. She seemed to be not 

responding appropriately as per the daughter and would forget details of their 

conversations since yesterday. She did not note fevers, no convulsions, no head 

trauma, no convulsions, no falls, no diarrhea, no vomiting. Currently the 

patient is comfortable on a chair, not in distress, denies headache or dizziness

, no abdominal pain, no nausea, no dysuria. Urinalysis in the ED revealed some 

WBC's. Infectious Diseases consult is requested to further evaluate and manage.





Review of Systems





- Review of Systems


All systems: reviewed and no additional remarkable complaints except (as per HPI

)





Past Patient History





- Infectious Disease


Hx of Infectious Diseases: None





- Tetanus Immunizations


Tetanus Immunization: Unknown





- Past Social History


Smoking Status: Never Smoked





- CARDIAC


Hx Cardiac Disorders: Yes


Hx Congestive Heart Failure: Yes


Hx Hypercholesterolemia: Yes


Hx Hypertension: Yes





- PULMONARY


Hx Respiratory Disorders: Yes


Hx Bronchitis: Yes





- NEUROLOGICAL


Hx Neurological Disorder: Yes


HX Cerebrovascular Accident: Yes (no deficits)


Hx Transient Ischemic Attacks (TIA): Yes





- HEENT


Hx HEENT Problems: Yes (uses glasses)


Hx Cataracts: Yes (sx)


Hx Glaucoma: Yes





- RENAL


Hx Chronic Kidney Disease: No





- ENDOCRINE/METABOLIC


Hx Endocrine Disorders: Yes


Hx Diabetes Mellitus Type 2: Yes


Hx Hypothyroidism: Yes





- HEMATOLOGICAL/ONCOLOGICAL


Hx Blood Transfusions: No





- INTEGUMENTARY


Hx Dermatological Problems: No





- MUSCULOSKELETAL/RHEUMATOLOGICAL


Hx Falls: No





- GASTROINTESTINAL


Hx Gastrointestinal Disorders: Yes (IBS)





- GENITOURINARY/GYNECOLOGICAL


Hx Genitourinary Disorders: Yes (frequent urination)





- PSYCHIATRIC


Hx Emotional Abuse: No


Hx Physical Abuse: No


Hx Substance Use: No





- SURGICAL HISTORY


Hx Surgeries: Yes


Hx Appendectomy: Yes


Hx Cholecystectomy: Yes





- ANESTHESIA


Hx Anesthesia: Yes


Hx Anesthesia Reactions: No


Hx Malignant Hyperthermia: No





Meds


Allergies/Adverse Reactions: 


 Allergies











Allergy/AdvReac Type Severity Reaction Status Date / Time


 


hydromorphone HCl Allergy Severe HALLUCINATI Verified 11/09/17 17:16





[From Dilaudid]   ONS  


 


tramadol Allergy Severe RASH Verified 11/09/17 17:16














- Medications


Medications: 


 Current Medications





Atorvastatin Calcium (Lipitor)  10 mg PO DIN [facility]


Clopidogrel Bisulfate (Plavix)  75 mg PO DAILY [facility]


   Last Admin: 11/10/17 10:59 Dose:  75 mg


Furosemide (Lasix)  40 mg IVP DAILY [facility]


   Last Admin: 11/10/17 10:59 Dose:  40 mg


Gabapentin (Neurontin)  300 mg PO TID [facility]


   PRN Reason: Protocol


   Last Admin: 11/10/17 14:25 Dose:  300 mg


Glimepiride (Amaryl)  4 mg PO BID [facility]


   Last Admin: 11/10/17 10:59 Dose:  4 mg


Sodium Chloride (Sodium Chloride 0.45%)  1,000 mls @ 40 mls/hr IV .Q24H [facility]


   Last Admin: 11/10/17 03:25 Dose:  40 mls/hr


Meropenem 1 gm/ Dextrose  100 mls @ 100 mls/hr IVPB Q8 [facility]


   PRN Reason: Protocol


   Stop: 11/16/17 23:46


   Last Admin: 11/10/17 14:24 Dose:  100 mls/hr


Vancomycin HCl (Vancomycin 1gm)  1 gm in 250 mls @ 167 mls/hr IVPB Q12H [facility]


   PRN Reason: Protocol


   Last Admin: 11/10/17 11:02 Dose:  167 mls/hr


Insulin Human Regular (Humulin R Med)  0 units SC ACHS [facility]


   PRN Reason: Protocol


   Last Admin: 11/10/17 11:30 Dose:  3 units


Ketorolac Tromethamine (Toradol)  30 mg IVP Q6 PRN


   PRN Reason: Headache


Levothyroxine Sodium (Synthroid)  25 mcg PO DAILY [facility]


   Last Admin: 11/10/17 10:59 Dose:  25 mcg


Loperamide HCl (Imodium)  2 mg PO QID PRN


   PRN Reason: Diarrhea


Metformin HCl (Glucophage)  1,000 mg PO BID [facility]


   Last Admin: 11/10/17 11:02 Dose:  1,000 mg











Physical Exam





- Constitutional


Appears: Non-toxic, No Acute Distress





- Head Exam


Head Exam: NORMAL INSPECTION





- ENT Exam


ENT Exam: Mucous Membranes Moist





- Neck Exam


Neck exam: Negative for: Lymphadenopathy, Meningismus





- Respiratory Exam


Respiratory Exam: Decreased Breath Sounds





- Cardiovascular Exam


Cardiovascular Exam: +S1, +S2





- GI/Abdominal Exam


GI & Abdominal Exam: Soft.  absent: Tenderness





Results





- Vital Signs


Recent Vital Signs: 


 Last Vital Signs











Temp  98.3 F   11/10/17 12:00


 


Pulse  80   11/10/17 12:00


 


Resp  18   11/10/17 12:00


 


BP  134/60   11/10/17 12:00


 


Pulse Ox  96   11/10/17 00:01














- Labs


Result Diagrams: 


 11/10/17 06:00





 11/10/17 06:00


Labs: 


 Laboratory Results - last 24 hr











  11/09/17 11/09/17 11/10/17





  19:30 22:24 06:00


 


WBC    8.0


 


RBC    3.38 L


 


Hgb    10.2 L


 


Hct    30.9 L


 


MCV    91.4


 


MCH    30.2


 


MCHC    33.0


 


RDW    15.0 H


 


Plt Count    159


 


MPV    9.7


 


pO2   203 H 


 


VBG pH   7.48 H 


 


VBG pCO2   40.0 


 


VBG HCO3   29.8 H 


 


VBG Total CO2   31.0 H 


 


VBG O2 Sat (Calc)   98.9 H 


 


VBG Base Excess   5.8 H 


 


VBG Potassium   2.9 L 


 


Sodium   138.0 


 


Chloride   103.0 


 


Glucose   144 H 


 


Lactate   2.6 H 


 


FiO2   21.0 


 


Potassium   


 


Carbon Dioxide   


 


Anion Gap   


 


BUN   


 


Creatinine   


 


Est GFR ( Amer)   


 


Est GFR (Non-Af Amer)   


 


Random Glucose   


 


Calcium   


 


Magnesium   


 


Total Bilirubin   


 


AST   


 


ALT   


 


Alkaline Phosphatase   


 


Total Protein   


 


Albumin   


 


Globulin   


 


Albumin/Globulin Ratio   


 


Venous Blood Potassium   2.9 L 


 


Urine Color  Yellow  


 


Urine Appearance  Slight-cloudy  


 


Urine pH  6.0  


 


Ur Specific Gravity  1.015  


 


Urine Protein  30 H  


 


Urine Glucose (UA)  Negative  


 


Urine Ketones  Negative  


 


Urine Blood  Negative  


 


Urine Nitrate  Positive H  


 


Urine Bilirubin  Negative  


 


Urine Urobilinogen  0.2  


 


Ur Leukocyte Esterase  Small H  


 


Urine RBC  1 - 3  


 


Urine WBC  10 - 15  


 


Ur Epithelial Cells  10 - 12  


 


Urine Bacteria  Mod  














  11/10/17 11/10/17 11/10/17





  06:00 06:00 09:40


 


WBC   


 


RBC   


 


Hgb   


 


Hct   


 


MCV   


 


MCH   


 


MCHC   


 


RDW   


 


Plt Count   


 


MPV   


 


pO2   32  44


 


VBG pH   7.43  7.44 H


 


VBG pCO2   45.0  40.0


 


VBG HCO3   29.9 H  27.2


 


VBG Total CO2   31.3 H  28.4 H


 


VBG O2 Sat (Calc)   69.8 H  82.7 H


 


VBG Base Excess   4.8 H  2.8 H


 


VBG Potassium   3.0 L  2.8 L


 


Sodium  137  138.0  136.0


 


Chloride  99  102.0  99.0


 


Glucose   228 H  250 H


 


Lactate   2.6 H  4.3 H*


 


FiO2   21.0  21.0


 


Potassium  3.2 L  


 


Carbon Dioxide  28  


 


Anion Gap  13  


 


BUN  13  


 


Creatinine  0.8  


 


Est GFR ( Amer)  > 60  


 


Est GFR (Non-Af Amer)  > 60  


 


Random Glucose  220 H  


 


Calcium  8.6  


 


Magnesium  1.5 L  


 


Total Bilirubin  0.6  


 


AST  21  


 


ALT  32  


 


Alkaline Phosphatase  56  


 


Total Protein  6.5  


 


Albumin  3.5  


 


Globulin  3.0  


 


Albumin/Globulin Ratio  1.2  


 


Venous Blood Potassium   3.0 L  2.8 L


 


Urine Color   


 


Urine Appearance   


 


Urine pH   


 


Ur Specific Gravity   


 


Urine Protein   


 


Urine Glucose (UA)   


 


Urine Ketones   


 


Urine Blood   


 


Urine Nitrate   


 


Urine Bilirubin   


 


Urine Urobilinogen   


 


Ur Leukocyte Esterase   


 


Urine RBC   


 


Urine WBC   


 


Ur Epithelial Cells   


 


Urine Bacteria   














Assessment & Plan





- Assessment and Plan (Free Text)


Plan: 





Assessment


Systemic Inflammatory REsponse Syndrome, R/O sepsis due to UTI


DM


chronic CHF


hypothyroidism


history of CVA, R/O new onset CVA


HTN


S/P appendectomy


S/P cholecystectomy


cataracts


morbid obesity with BMI 41 





Plan


Started patient on Vancomycin and Merrem pending blood and urine cx


Neuro following for confusion - follow up brain MRI results


will monitor clinically

## 2017-11-11 LAB
ALBUMIN/GLOB SERPL: 1.2 {RATIO} (ref 1.1–1.8)
ALBUMIN/GLOB SERPL: 1.3 {RATIO} (ref 1.1–1.8)
ALP SERPL-CCNC: 51 U/L (ref 38–126)
ALP SERPL-CCNC: 53 U/L (ref 38–126)
ALT SERPL-CCNC: 26 U/L (ref 7–56)
ALT SERPL-CCNC: 32 U/L (ref 7–56)
AST SERPL-CCNC: 24 U/L (ref 14–36)
AST SERPL-CCNC: 28 U/L (ref 14–36)
BILIRUB SERPL-MCNC: 0.6 MG/DL (ref 0.2–1.3)
BILIRUB SERPL-MCNC: 0.6 MG/DL (ref 0.2–1.3)
BUN SERPL-MCNC: 14 MG/DL (ref 7–21)
BUN SERPL-MCNC: 15 MG/DL (ref 7–21)
CALCIUM SERPL-MCNC: 8.6 MG/DL (ref 8.4–10.5)
CALCIUM SERPL-MCNC: 8.6 MG/DL (ref 8.4–10.5)
CHLORIDE SERPL-SCNC: 97 MMOL/L (ref 98–107)
CHLORIDE SERPL-SCNC: 99 MMOL/L (ref 98–107)
CO2 SERPL-SCNC: 25 MMOL/L (ref 21–33)
CO2 SERPL-SCNC: 28 MMOL/L (ref 21–33)
ERYTHROCYTE [DISTWIDTH] IN BLOOD BY AUTOMATED COUNT: 15.1 % (ref 11.5–14.5)
ERYTHROCYTE [DISTWIDTH] IN BLOOD BY AUTOMATED COUNT: 15.2 % (ref 11.5–14.5)
GLOBULIN SER-MCNC: 2.9 GM/DL
GLOBULIN SER-MCNC: 2.9 GM/DL
GLUCOSE SERPL-MCNC: 208 MG/DL (ref 70–110)
GLUCOSE SERPL-MCNC: 96 MG/DL (ref 70–110)
HCT VFR BLD CALC: 28.8 % (ref 36–48)
HCT VFR BLD CALC: 29.9 % (ref 36–48)
MAGNESIUM SERPL-MCNC: 1.6 MG/DL (ref 1.7–2.2)
MCH RBC QN AUTO: 30 PG (ref 25–35)
MCH RBC QN AUTO: 30.5 PG (ref 25–35)
MCHC RBC AUTO-ENTMCNC: 32.6 G/DL (ref 31–37)
MCHC RBC AUTO-ENTMCNC: 33.1 G/DL (ref 31–37)
MCV RBC AUTO: 92 FL (ref 80–105)
MCV RBC AUTO: 92 FL (ref 80–105)
PLATELET # BLD: 153 10^3/UL (ref 120–450)
PLATELET # BLD: 162 10^3/UL (ref 120–450)
PMV BLD AUTO: 9.1 FL (ref 7–11)
PMV BLD AUTO: 9.6 FL (ref 7–11)
POTASSIUM SERPL-SCNC: 2.7 MMOL/L (ref 3.6–5)
POTASSIUM SERPL-SCNC: 3.4 MMOL/L (ref 3.6–5)
PROT SERPL-MCNC: 6.3 G/DL (ref 5.8–8.3)
PROT SERPL-MCNC: 6.6 G/DL (ref 5.8–8.3)
SODIUM SERPL-SCNC: 134 MMOL/L (ref 132–148)
SODIUM SERPL-SCNC: 136 MMOL/L (ref 132–148)
WBC # BLD AUTO: 8.3 10^3/UL (ref 4.5–11)
WBC # BLD AUTO: 9.5 10^3/UL (ref 4.5–11)

## 2017-11-11 RX ADMIN — MEROPENEM SCH MLS/HR: 1 INJECTION INTRAVENOUS at 14:45

## 2017-11-11 RX ADMIN — MEROPENEM SCH MLS/HR: 1 INJECTION INTRAVENOUS at 21:30

## 2017-11-11 RX ADMIN — VANCOMYCIN HYDROCHLORIDE SCH MLS/HR: 1 INJECTION, POWDER, LYOPHILIZED, FOR SOLUTION INTRAVENOUS at 09:59

## 2017-11-11 RX ADMIN — POTASSIUM CHLORIDE SCH MEQ: 20 TABLET, EXTENDED RELEASE ORAL at 09:58

## 2017-11-11 RX ADMIN — INSULIN HUMAN SCH UNITS: 100 INJECTION, SOLUTION PARENTERAL at 17:27

## 2017-11-11 RX ADMIN — INSULIN HUMAN SCH: 100 INJECTION, SOLUTION PARENTERAL at 09:54

## 2017-11-11 RX ADMIN — INSULIN HUMAN SCH UNITS: 100 INJECTION, SOLUTION PARENTERAL at 11:47

## 2017-11-11 RX ADMIN — VANCOMYCIN HYDROCHLORIDE SCH MLS/HR: 1 INJECTION, POWDER, LYOPHILIZED, FOR SOLUTION INTRAVENOUS at 21:31

## 2017-11-11 RX ADMIN — MEROPENEM SCH MLS/HR: 1 INJECTION INTRAVENOUS at 05:30

## 2017-11-11 RX ADMIN — INSULIN HUMAN SCH: 100 INJECTION, SOLUTION PARENTERAL at 22:12

## 2017-11-11 NOTE — RAD
PROCEDURE:  Bilateral Feet Radiographs.



HISTORY:

big toe pain



COMPARISON:

None.



FINDINGS:



BONES:

Right Foot: Normal. No fracture. Diffuse osteopenia noted.



Left Foot: Normal. No fracture. Diffuse osteopenia noted.



JOINTS:

Right Foot: Arthritic degenerative changes 



Left Foot: Arthritic degenerative changes 



SOFT TISSUES:

Right Foot: Mild diffuse soft tissue edema.



Left Foot: Mild diffuse soft tissue edema. 



OTHER FINDINGS:

None.



IMPRESSION:

No evidence of acute fracture or dislocation.  Diffuse osteopenia and 

mild soft tissue edema.

## 2017-11-11 NOTE — CP.PCM.PN
Subjective





- Date & Time of Evaluation


Date of Evaluation: 11/11/17


Time of Evaluation: 10:26





- Subjective


Subjective: 





80 y/o female seen at bedside with attending Dr. Jacob for bilateral big toe 

pain. Pt states she has not had any pain in the toes since admission, and 

agrees that it is likely due to pressure from the shoes she wears, as well as 

long periods of walking. Pt denies any F/C/N/V/CP/SOb at this time.





Objective





- Vital Signs/Intake and Output


Vital Signs (last 24 hours): 


 











Temp Pulse Resp BP Pulse Ox


 


 98.4 F   73   19   126/57 L  97 


 


 11/11/17 06:00  11/11/17 06:00  11/11/17 06:00  11/11/17 09:59  11/11/17 06:00








Intake and Output: 


 











 11/11/17 11/11/17





 06:59 18:59


 


Intake Total 920 


 


Balance 920 














- Medications


Medications: 


 Current Medications





Atorvastatin Calcium (Lipitor)  10 mg PO DIN [facility]


   Last Admin: 11/10/17 21:48 Dose:  10 mg


Clopidogrel Bisulfate (Plavix)  75 mg PO DAILY [facility]


   Last Admin: 11/11/17 09:58 Dose:  75 mg


Furosemide (Lasix)  40 mg IVP DAILY [facility]


   Last Admin: 11/11/17 09:59 Dose:  40 mg


Gabapentin (Neurontin)  300 mg PO TID [facility]


   PRN Reason: Protocol


   Last Admin: 11/11/17 09:59 Dose:  300 mg


Glimepiride (Amaryl)  4 mg PO BID [facility]


   Last Admin: 11/11/17 09:59 Dose:  4 mg


Sodium Chloride (Sodium Chloride 0.45%)  1,000 mls @ 40 mls/hr IV .Q24H [facility]


   Last Admin: 11/10/17 23:02 Dose:  Not Given


Meropenem 1 gm/ Dextrose  100 mls @ 100 mls/hr IVPB Q8 [facility]


   PRN Reason: Protocol


   Stop: 11/16/17 23:46


   Last Admin: 11/11/17 05:30 Dose:  100 mls/hr


Vancomycin HCl (Vancomycin 1gm)  1 gm in 250 mls @ 167 mls/hr IVPB Q12H [facility]


   PRN Reason: Protocol


   Last Admin: 11/11/17 09:59 Dose:  167 mls/hr


Pantoprazole Sodium (Protonix 40mg Ivpb)  40 mg in 100 mls @ 200 mls/hr IVPB 

0600 [facility]


Insulin Human Regular (Humulin R Med)  0 units SC ACHS [facility]


   PRN Reason: Protocol


   Last Admin: 11/11/17 09:54 Dose:  Not Given


Ketorolac Tromethamine (Toradol)  30 mg IVP Q6 PRN


   PRN Reason: Headache


   Last Admin: 11/10/17 16:06 Dose:  30 mg


Levothyroxine Sodium (Synthroid)  25 mcg PO DAILY [facility]


   Last Admin: 11/11/17 09:59 Dose:  25 mcg


Loperamide HCl (Imodium)  2 mg PO QID PRN


   PRN Reason: Diarrhea


   Last Admin: 11/10/17 21:46 Dose:  2 mg


Metformin HCl (Glucophage)  1,000 mg PO BID [facility]


   Last Admin: 11/11/17 09:59 Dose:  1,000 mg


Potassium Chloride (K-Dur 20 Meq Er Tab)  20 meq PO BRK [facility]


   Last Admin: 11/11/17 09:58 Dose:  20 meq











- Labs


Labs: 


 





 11/11/17 06:00 





 11/11/17 06:00 





 











PT  12.7 SECONDS (9.4-12.5)  H  11/09/17  18:00    


 


INR  1.16  (0.93-1.08)  H  11/09/17  18:00    


 


APTT  30.9 Seconds (25.1-36.5)   11/09/17  18:00    














- Constitutional


Appears: Well, Non-toxic, No Acute Distress





- Extremities Exam


Additional comments: 


Lower extremity focused examination:


Vasc: DP/PT pulses are not palpable due to +3 pitting edema extending from 

tibial tuberosity distally to digits B/L; temp gradient warm to cool B/L; CFT 

delayed to all digits but present


Derm: No open lesions, no erythema, no interdigital maceration, no clinical 

suspicion of infection


Neuro: Protective sensation grossly intact B/L


Ortho: No tenderness elicited on palpation of bilateral hallux. No signs of 

ingrown toenail or increased pressure to toes. 








- Neurological Exam


Neurological Exam: Alert, Awake, Oriented x3





- Psychiatric Exam


Psychiatric exam: Normal Affect, Normal Mood





Assessment and Plan





- Assessment and Plan (Free Text)


Assessment: 





80 y/o diabetic female seen for bilateral big toe pain secondary to shoegear 

and osteoarthritis


Plan: 


80 y/o female seen at bedside with attending Dr. Jacob


Chart, labs and vitals reviewed- afebrile, WBC 8.3


X-rays of bilateral feet reveal osteopenic and osteoarthritic changes. No acute 

fractures or dislocations noted, no subungual exostoses present


Recommended patient to continue with compression stockings at home 


Explained to patient the importance of ambulation in shoes with a wider toe box 

that does not put pressure on her big toes


Recommend patient obtain diabetic shoes to accommodate the toes and provide 

more room


Podiatry to sign off at this time


Thank you for this consult

## 2017-11-11 NOTE — RAD
HISTORY:

ABDOMINAL PAIN  



COMPARISON:

No prior.



FINDINGS:



BOWEL:

Suboptimal study due to the patient's body habitus. Slightly dilated 

small and large bowel loops seen at the mid abdomen.



BONES:

Normal.



OTHER FINDINGS:

None.



IMPRESSION:

Suboptimal study due to the patient's body habitus. Mildly dilated 

small and large bowel loops seen at the mid abdomen.

## 2017-11-11 NOTE — CP.PCM.PN
Subjective





- Date & Time of Evaluation


Date of Evaluation: 11/11/17


Time of Evaluation: 08:15





- Subjective


Subjective: 





Patient was having some loose bowel movement but became constipated this morning

, no fevers overnight, mild abdominal pain, no vomiting. No fevers overnight.





Objective





- Vital Signs/Intake and Output


Vital Signs (last 24 hours): 


 











Temp Pulse Resp BP Pulse Ox


 


 98.2 F   76   19   103/49 L  96 


 


 11/11/17 00:01  11/11/17 02:00  11/11/17 00:01  11/11/17 00:01  11/11/17 00:01








Intake and Output: 


 











 11/10/17 11/11/17





 18:59 06:59


 


Intake Total 660 


 


Output Total 500 


 


Balance 160 














- Medications


Medications: 


 Current Medications





Atorvastatin Calcium (Lipitor)  10 mg PO DIN [facility]


   Last Admin: 11/10/17 21:48 Dose:  10 mg


Clopidogrel Bisulfate (Plavix)  75 mg PO DAILY [facility]


   Last Admin: 11/10/17 10:59 Dose:  75 mg


Furosemide (Lasix)  40 mg IVP DAILY [facility]


   Last Admin: 11/10/17 10:59 Dose:  40 mg


Gabapentin (Neurontin)  300 mg PO TID [facility]


   PRN Reason: Protocol


   Last Admin: 11/10/17 18:38 Dose:  300 mg


Glimepiride (Amaryl)  4 mg PO BID [facility]


   Last Admin: 11/10/17 18:38 Dose:  4 mg


Sodium Chloride (Sodium Chloride 0.45%)  1,000 mls @ 40 mls/hr IV .Q24H [facility]


   Last Admin: 11/10/17 23:02 Dose:  Not Given


Meropenem 1 gm/ Dextrose  100 mls @ 100 mls/hr IVPB Q8 [facility]


   PRN Reason: Protocol


   Stop: 11/16/17 23:46


   Last Admin: 11/11/17 05:30 Dose:  100 mls/hr


Vancomycin HCl (Vancomycin 1gm)  1 gm in 250 mls @ 167 mls/hr IVPB Q12H [facility]


   PRN Reason: Protocol


   Last Admin: 11/10/17 22:57 Dose:  167 mls/hr


Insulin Human Regular (Humulin R Med)  0 units SC ACHS [facility]


   PRN Reason: Protocol


   Last Admin: 11/10/17 23:03 Dose:  Not Given


Ketorolac Tromethamine (Toradol)  30 mg IVP Q6 PRN


   PRN Reason: Headache


   Last Admin: 11/10/17 16:06 Dose:  30 mg


Levothyroxine Sodium (Synthroid)  25 mcg PO DAILY [facility]


   Last Admin: 11/10/17 10:59 Dose:  25 mcg


Loperamide HCl (Imodium)  2 mg PO QID PRN


   PRN Reason: Diarrhea


   Last Admin: 11/10/17 21:46 Dose:  2 mg


Metformin HCl (Glucophage)  1,000 mg PO BID [facility]


   Last Admin: 11/10/17 18:39 Dose:  1,000 mg











- Labs


Labs: 


 





 11/10/17 06:00 





 11/10/17 06:00 





 











PT  12.7 SECONDS (9.4-12.5)  H  11/09/17  18:00    


 


INR  1.16  (0.93-1.08)  H  11/09/17  18:00    


 


APTT  30.9 Seconds (25.1-36.5)   11/09/17  18:00    














- Constitutional


Appears: Non-toxic





- Head Exam


Head Exam: NORMAL INSPECTION





- ENT Exam


ENT Exam: Mucous Membranes Moist





- Neck Exam


Neck Exam: absent: Meningismus





- Respiratory Exam


Respiratory Exam: Decreased Breath Sounds





- Cardiovascular Exam


Cardiovascular Exam: +S1, +S2





- GI/Abdominal Exam


GI & Abdominal Exam: Soft.  absent: Tenderness





Assessment and Plan





- Assessment and Plan (Free Text)


Plan: 





Assessment


sepsis due to UTI with gram negative bacilli


DM


chronic CHF


hypothyroidism


history of CVA, R/O new onset CVA


HTN


S/P appendectomy


S/P cholecystectomy


cataracts


morbid obesity with BMI 41 





Plan


continue Merrem and will d/c Vancomycin pending identification and 

sensitivities of the gram negative bacilli in the urine cx


Neuro following for confusion - follow up brain MRI results


reviewed CT A/P which did not show acute pathology


stool for C. diff. is negative


will continue to monitor clinically

## 2017-11-12 VITALS
DIASTOLIC BLOOD PRESSURE: 52 MMHG | RESPIRATION RATE: 20 BRPM | HEART RATE: 79 BPM | TEMPERATURE: 98.1 F | SYSTOLIC BLOOD PRESSURE: 138 MMHG

## 2017-11-12 VITALS — OXYGEN SATURATION: 98 %

## 2017-11-12 LAB
ALBUMIN/GLOB SERPL: 1.1 {RATIO} (ref 1.1–1.8)
ALP SERPL-CCNC: 49 U/L (ref 38–126)
ALT SERPL-CCNC: 21 U/L (ref 7–56)
AST SERPL-CCNC: 24 U/L (ref 14–36)
BILIRUB SERPL-MCNC: 0.5 MG/DL (ref 0.2–1.3)
BUN SERPL-MCNC: 16 MG/DL (ref 7–21)
CALCIUM SERPL-MCNC: 8.8 MG/DL (ref 8.4–10.5)
CHLORIDE SERPL-SCNC: 103 MMOL/L (ref 98–107)
CO2 SERPL-SCNC: 28 MMOL/L (ref 21–33)
ERYTHROCYTE [DISTWIDTH] IN BLOOD BY AUTOMATED COUNT: 14.9 % (ref 11.5–14.5)
GLOBULIN SER-MCNC: 2.9 GM/DL
GLUCOSE SERPL-MCNC: 103 MG/DL (ref 70–110)
HCT VFR BLD CALC: 28.2 % (ref 36–48)
MCH RBC QN AUTO: 30.4 PG (ref 25–35)
MCHC RBC AUTO-ENTMCNC: 33 G/DL (ref 31–37)
MCV RBC AUTO: 92.2 FL (ref 80–105)
PLATELET # BLD: 158 10^3/UL (ref 120–450)
PMV BLD AUTO: 9.4 FL (ref 7–11)
POTASSIUM SERPL-SCNC: 3.1 MMOL/L (ref 3.6–5)
PROT SERPL-MCNC: 6.2 G/DL (ref 5.8–8.3)
SODIUM SERPL-SCNC: 139 MMOL/L (ref 132–148)
WBC # BLD AUTO: 8.8 10^3/UL (ref 4.5–11)

## 2017-11-12 RX ADMIN — POTASSIUM CHLORIDE SCH MEQ: 20 TABLET, EXTENDED RELEASE ORAL at 09:54

## 2017-11-12 RX ADMIN — INSULIN HUMAN SCH: 100 INJECTION, SOLUTION PARENTERAL at 07:56

## 2017-11-12 RX ADMIN — MEROPENEM SCH MLS/HR: 1 INJECTION INTRAVENOUS at 05:34

## 2017-11-12 RX ADMIN — VANCOMYCIN HYDROCHLORIDE SCH MLS/HR: 1 INJECTION, POWDER, LYOPHILIZED, FOR SOLUTION INTRAVENOUS at 09:53

## 2017-11-12 RX ADMIN — INSULIN HUMAN SCH UNITS: 100 INJECTION, SOLUTION PARENTERAL at 12:08

## 2017-11-12 NOTE — CP.PCM.PN
Subjective





- Date & Time of Evaluation


Date of Evaluation: 11/12/17


Time of Evaluation: 10:00





- Subjective


Subjective: 





Feeling better, no abdominal pain, no fevers, not in distress.





Objective





- Vital Signs/Intake and Output


Vital Signs (last 24 hours): 


 











Temp Pulse Resp BP Pulse Ox


 


 98.1 F   79   20   138/52 L  98 


 


 11/12/17 12:00  11/12/17 12:00  11/12/17 12:00  11/12/17 12:00  11/12/17 06:00








Intake and Output: 


 











 11/12/17 11/12/17





 06:59 18:59


 


Intake Total 1090 


 


Balance 1090 














- Labs


Labs: 


 





 11/12/17 05:30 





 11/12/17 05:30 





 











PT  12.7 SECONDS (9.4-12.5)  H  11/09/17  18:00    


 


INR  1.16  (0.93-1.08)  H  11/09/17  18:00    


 


APTT  30.9 Seconds (25.1-36.5)   11/09/17  18:00    














- Constitutional


Appears: Non-toxic





- Head Exam


Head Exam: NORMAL INSPECTION





- ENT Exam


ENT Exam: Mucous Membranes Moist





- Neck Exam


Neck Exam: absent: Meningismus





- Respiratory Exam


Respiratory Exam: Decreased Breath Sounds





- Cardiovascular Exam


Cardiovascular Exam: +S1, +S2





- GI/Abdominal Exam


GI & Abdominal Exam: Soft.  absent: Tenderness





Assessment and Plan





- Assessment and Plan (Free Text)


Plan: 


Assessment


sepsis due to UTI with Klebsiella 


right occipital lobe acute infarction


DM


chronic CHF


hypothyroidism


history of CVA


HTN


S/P appendectomy


S/P cholecystectomy


cataracts


morbid obesity with BMI 41 





Plan


continue Merrem day 3 - would complete 7-10 days of antibiotics (can be 

switched to Rocephin to complete therapy)


reviewed CT A/P which did not show acute pathology


stool for C. diff. is negative


would suggest monitoring of lactic acid in the blood

## 2017-11-12 NOTE — DS
HOSPITAL COURSE:  She is going to be discharged to [facility] today.  She is

comfortable, the daughter is present.  She is in good spirits.  She is

smiling.  She is happy.  She is eating.  Medications are working and she is

improving.



PHYSICAL EXAMINATION:

VITAL SIGNS:  She has a 99 temp, 82 pulse, 115/51 blood pressure, 18

respiratory rate, and 98% O2 sat on room air.

HEENT:  Head is atraumatic and normocephalic.  Throat is moist.

NECK:  Supple.

HEART:  Regular rate.

LUNGS:  Clear to auscultation.

ABDOMEN:  Soft and obese.

EXTREMITIES:  No edema.



MEDICATIONS:  She is going to go there on Amaryl, Glucophage, insulin

coverage, Imodium as needed, potassium twice a day, Lasix 40 twice a day,

Lipitor, Merrem for 7 more days IV, Neurontin, Plavix, potassium twice a

day, and Protonix daily.  She is currently on IV fluids for 7 more days,

levothyroxine, and vancomycin for 7 more days IV.



LABORATORY DATA:  She had a white count of 8.8, hemoglobin of 9.3,

hematocrit of 28.2, and platelets of 158.  She had a 139 sodium and

potassium 3.1.  She is going to need K rider and I bumped up to p.o.

potassium to twice a day, I will check with [facility].  BUN is 16 and

creatinine is 1.  GFR is 53.  Sugar is 103, calcium is 8.8, total bili is

0.5, AST is 24, ALT is 21, alkaline phosphatase 49, and total protein 6.2.



ASSESSMENT AND PLAN:  She had urinary tract infection.  She was seen by

Infectious Disease, Neurology, and Podiatry.  She had CAT scan of abdomen

and pelvis, which was good.  Also she had a foot x-ray, which showed no

evidence of anything bad.  Continue with aggressive treatment and care at

[facility] with physical therapy, I will be seeing her there on Tuesday. 

Discussed with the nurse.  We will keep the IV in, so she is going to go

with the IV fluids and IV antibiotics.





__________________________________________

Guero Reilly DO





DD:  11/12/2017 10:13:21

DT:  11/12/2017 10:56:19

Job # [id number]

## 2017-11-13 NOTE — CP.PCM.PCO
Physician Communication Note





- Physician Communication Note


Physician Communication Note: pt was d/c before this writer evaluation

## 2017-11-13 NOTE — CP.PCM.PCO
Physician Communication Note





- Physician Communication Note


Physician Communication Note: this writer was on vacation, pt will be seen today

## 2017-12-11 ENCOUNTER — HOSPITAL ENCOUNTER (OUTPATIENT)
Dept: HOSPITAL 42 - ED | Age: 82
Setting detail: OBSERVATION
LOS: 1 days | Discharge: HOME HEALTH | End: 2017-12-12
Attending: FAMILY MEDICINE | Admitting: FAMILY MEDICINE
Payer: MEDICARE

## 2017-12-11 VITALS — BODY MASS INDEX: 39.4 KG/M2

## 2017-12-11 DIAGNOSIS — Z79.84: ICD-10-CM

## 2017-12-11 DIAGNOSIS — E11.36: ICD-10-CM

## 2017-12-11 DIAGNOSIS — R26.81: Primary | ICD-10-CM

## 2017-12-11 DIAGNOSIS — I11.0: ICD-10-CM

## 2017-12-11 DIAGNOSIS — H40.9: ICD-10-CM

## 2017-12-11 DIAGNOSIS — E03.9: ICD-10-CM

## 2017-12-11 DIAGNOSIS — K64.9: ICD-10-CM

## 2017-12-11 DIAGNOSIS — I69.312: ICD-10-CM

## 2017-12-11 DIAGNOSIS — I50.9: ICD-10-CM

## 2017-12-11 DIAGNOSIS — E11.39: ICD-10-CM

## 2017-12-11 DIAGNOSIS — K58.9: ICD-10-CM

## 2017-12-11 LAB
ALBUMIN/GLOB SERPL: 1.1 {RATIO} (ref 1.1–1.8)
ALP SERPL-CCNC: 88 U/L (ref 38–126)
ALT SERPL-CCNC: 26 U/L (ref 7–56)
APPEARANCE UR: CLEAR
APTT BLD: 30.8 SECONDS (ref 25.1–36.5)
AST SERPL-CCNC: 24 U/L (ref 14–36)
BASOPHILS # BLD AUTO: 0.03 K/MM3 (ref 0–2)
BASOPHILS NFR BLD: 0.4 % (ref 0–3)
BILIRUB SERPL-MCNC: 0.3 MG/DL (ref 0.2–1.3)
BILIRUB UR-MCNC: NEGATIVE MG/DL
BUN SERPL-MCNC: 31 MG/DL (ref 7–21)
CALCIUM SERPL-MCNC: 10 MG/DL (ref 8.4–10.5)
CHLORIDE SERPL-SCNC: 104 MMOL/L (ref 98–107)
CHOLEST SERPL-MCNC: 152 MG/DL (ref 130–200)
CO2 SERPL-SCNC: 20 MMOL/L (ref 21–33)
EOSINOPHIL # BLD: 0.4 10*3/UL (ref 0–0.7)
EOSINOPHIL NFR BLD: 4.6 % (ref 1.5–5)
ERYTHROCYTE [DISTWIDTH] IN BLOOD BY AUTOMATED COUNT: 15.1 % (ref 11.5–14.5)
GLOBULIN SER-MCNC: 3.6 GM/DL
GLUCOSE SERPL-MCNC: 203 MG/DL (ref 70–110)
GLUCOSE UR STRIP-MCNC: 250 MG/DL
GRANULOCYTES # BLD: 6.39 10*3/UL (ref 1.4–6.5)
GRANULOCYTES NFR BLD: 75.6 % (ref 50–68)
HCT VFR BLD CALC: 31.4 % (ref 36–48)
INR PPP: 1.11 (ref 0.93–1.08)
KETONES UR STRIP-MCNC: NEGATIVE MG/DL
LEUKOCYTE ESTERASE UR-ACNC: (no result) LEU/UL
LYMPHOCYTES # BLD: 1.1 10*3/UL (ref 1.2–3.4)
LYMPHOCYTES NFR BLD AUTO: 13.5 % (ref 22–35)
MCH RBC QN AUTO: 30.3 PG (ref 25–35)
MCHC RBC AUTO-ENTMCNC: 33.4 G/DL (ref 31–37)
MCV RBC AUTO: 90.5 FL (ref 80–105)
MONOCYTES # BLD AUTO: 0.5 10*3/UL (ref 0.1–0.6)
MONOCYTES NFR BLD: 5.9 % (ref 1–6)
PH UR STRIP: 6 [PH] (ref 4.7–8)
PLATELET # BLD: 190 10^3/UL (ref 120–450)
PMV BLD AUTO: 9.4 FL (ref 7–11)
POTASSIUM SERPL-SCNC: 3.9 MMOL/L (ref 3.6–5)
PROT SERPL-MCNC: 7.5 G/DL (ref 5.8–8.3)
PROT UR STRIP-MCNC: (no result) MG/DL
RBC # UR STRIP: NEGATIVE /UL
SODIUM SERPL-SCNC: 137 MMOL/L (ref 132–148)
SP GR UR STRIP: 1.02 (ref 1–1.03)
TROPONIN I SERPL-MCNC: 0.02 NG/ML
UROBILINOGEN UR STRIP-ACNC: 0.2 E.U./DL
WBC # BLD AUTO: 8.5 10^3/UL (ref 4.5–11)

## 2017-12-11 PROCEDURE — 83090 ASSAY OF HOMOCYSTEINE: CPT

## 2017-12-11 PROCEDURE — 70551 MRI BRAIN STEM W/O DYE: CPT

## 2017-12-11 PROCEDURE — 99285 EMERGENCY DEPT VISIT HI MDM: CPT

## 2017-12-11 PROCEDURE — 82306 VITAMIN D 25 HYDROXY: CPT

## 2017-12-11 PROCEDURE — 97162 PT EVAL MOD COMPLEX 30 MIN: CPT

## 2017-12-11 PROCEDURE — 85610 PROTHROMBIN TIME: CPT

## 2017-12-11 PROCEDURE — 83921 ORGANIC ACID SINGLE QUANT: CPT

## 2017-12-11 PROCEDURE — 85730 THROMBOPLASTIN TIME PARTIAL: CPT

## 2017-12-11 PROCEDURE — 83615 LACTATE (LD) (LDH) ENZYME: CPT

## 2017-12-11 PROCEDURE — 82948 REAGENT STRIP/BLOOD GLUCOSE: CPT

## 2017-12-11 PROCEDURE — 82607 VITAMIN B-12: CPT

## 2017-12-11 PROCEDURE — 80053 COMPREHEN METABOLIC PANEL: CPT

## 2017-12-11 PROCEDURE — 71010: CPT

## 2017-12-11 PROCEDURE — 82550 ASSAY OF CK (CPK): CPT

## 2017-12-11 PROCEDURE — 80061 LIPID PANEL: CPT

## 2017-12-11 PROCEDURE — 84443 ASSAY THYROID STIM HORMONE: CPT

## 2017-12-11 PROCEDURE — 81001 URINALYSIS AUTO W/SCOPE: CPT

## 2017-12-11 PROCEDURE — 84446 ASSAY OF VITAMIN E: CPT

## 2017-12-11 PROCEDURE — 83880 ASSAY OF NATRIURETIC PEPTIDE: CPT

## 2017-12-11 PROCEDURE — 84484 ASSAY OF TROPONIN QUANT: CPT

## 2017-12-11 PROCEDURE — 87086 URINE CULTURE/COLONY COUNT: CPT

## 2017-12-11 PROCEDURE — 86900 BLOOD TYPING SEROLOGIC ABO: CPT

## 2017-12-11 PROCEDURE — 83036 HEMOGLOBIN GLYCOSYLATED A1C: CPT

## 2017-12-11 PROCEDURE — 85025 COMPLETE CBC W/AUTO DIFF WBC: CPT

## 2017-12-11 PROCEDURE — 93005 ELECTROCARDIOGRAM TRACING: CPT

## 2017-12-11 PROCEDURE — 70450 CT HEAD/BRAIN W/O DYE: CPT

## 2017-12-11 PROCEDURE — 97116 GAIT TRAINING THERAPY: CPT

## 2017-12-11 PROCEDURE — 86850 RBC ANTIBODY SCREEN: CPT

## 2017-12-11 PROCEDURE — 85027 COMPLETE CBC AUTOMATED: CPT

## 2017-12-11 PROCEDURE — 36415 COLL VENOUS BLD VENIPUNCTURE: CPT

## 2017-12-11 RX ADMIN — INSULIN HUMAN SCH: 100 INJECTION, SOLUTION PARENTERAL at 22:14

## 2017-12-11 NOTE — RAD
HISTORY:

stroke code  



COMPARISON:

11/09/2017 



FINDINGS:



LUNGS:

No active pulmonary disease.



PLEURA:

No significant pleural effusion identified, no pneumothorax apparent.



CARDIOVASCULAR:

 No radiographic findings to suggest acute or significant 

cardiovascular disease.



OSSEOUS STRUCTURES:

No significant abnormalities.



VISUALIZED UPPER ABDOMEN:

Normal.



OTHER FINDINGS:

None.



IMPRESSION:

No active disease. No significant interval change compared to the 

prior examination(s).

## 2017-12-11 NOTE — ED PDOC
Arrival/HPI





- General


Time Seen by Provider: 12/11/17 16:07


Historian: Patient





- History of Present Illness


Narrative History of Present Illness (Text): 


12/11/17 16:22


An 81 year old female, whose past medical history includes previous stroke, no 

residual deficits, hypertension, diabetes and CHF, was brought in by EMS to the 

emergency department complaining of right sided unsteady gait. Patient reports 

walking "towards the right". Patient called PMD and was sent to the emergency 

department for further evaluation. Notes symptoms started 30 minutes prior to 

arrival. Reports had a previous stroke last month, no residual deficits from 

stroke. Patient denies any other complaints at this time.





Time/Duration: 1/2 hour


Symptom Onset: Sudden


Symptom Course: Unchanged


Activities at Onset: Rest


Context: Home





Past Medical History





- Provider Review


Nursing Documentation Reviewed: Yes





- Past History


Past History: Non-Contributing





- Infectious Disease


Hx of Infectious Diseases: None





- Tetanus Immunization


Tetanus Immunization: Unknown





- Reproductive


Currently Pregnant: No





- Past Medical History


Past Medical History: No Previous





- Cardiac


Hx Cardiac Disorders: Yes


Hx Congestive Heart Failure: Yes


Hx Hypertension: Yes





- Pulmonary


Hx Respiratory Disorders: Yes


Hx Bronchitis: Yes





- Neurological


Hx Neurological Disorder: Yes


HX Cerebrovascular Accident: Yes (no deficits)


Hx Transient Ischemic Attacks (TIA): Yes





- HEENT


Hx HEENT Disorder: Yes (uses glasses)


Hx Cataracts: Yes (sx)


Hx Glaucoma: Yes





- Renal


Hx Renal Disorder: No





- Endocrine/Metabolic


Hx Endocrine Disorders: Yes


Hx Diabetes Mellitus Type 2: Yes


Hx Hypothyroidism: Yes





- Hematological/Oncological


Hx Blood Transfusions: No





- Integumentary


Hx Dermatological Disorder: No





- Musculoskeletal/Rheumatological


Hx Falls: No





- Gastrointestinal


Hx Gastrointestinal Disorders: Yes (IBS)





- Genitourinary/Gynecological


Hx Genitourinary Disorders: Yes (frequent urination)





- Psychiatric


Hx Emotional Abuse: No


Hx Physical Abuse: No


Hx Substance Use: No





- Past Surgical History


Past Surgical History: Unable to Obtain





- Surgical History


Hx Appendectomy: Yes


Hx Cholecystectomy: Yes





- Anesthesia


Hx Anesthesia: Yes


Hx Anesthesia Reactions: No


Hx Malignant Hyperthermia: No





- Suicidal Assessment


Feels Threatened In Home Enviroment: No





Family/Social History





- Physician Review


Nursing Documentation Reviewed: Yes


Family/Social History: No Known Family HX


Smoking Status: Never Smoked


Hx Alcohol Use: No


Hx Substance Use: No


Hx Substance Use Treatment: No





Allergies/Home Meds


Allergies/Adverse Reactions: 


Allergies





hydromorphone HCl [From Dilaudid] Allergy (Severe, Verified 11/09/17 17:16)


 HALLUCINATIONS


tramadol Allergy (Severe, Verified 11/09/17 17:16)


 RASH








Home Medications: 


 Home Meds











 Medication  Instructions  Recorded  Confirmed


 


Clopidogrel [Plavix] 75 mg PO DAILY 07/05/16 12/11/17


 


Furosemide [Lasix] 40 mg PO TID 07/05/16 12/11/17


 


metFORMIN [glucOPHAGE] 1,000 mg PO BID 07/05/16 12/11/17


 


Levothyroxine [Synthroid] 25 mcg PO DAILY 02/21/17 12/11/17


 


Cyanocobalamin [Vitamin B12 1000 1,000 mcg PO DAILY 08/25/17 12/11/17





mcg Tab]   


 


Glimepiride [amaRYL] 4 mg PO BID 08/25/17 12/11/17


 


Hydrocortisone [Procto-Med Hc] 30 gm RC BID 08/25/17 12/11/17


 


Omega-3-Acid Ethyl Esters [OMEGA 3] 1 tab PO BID 08/25/17 12/11/17


 


Norethindrone-Ethinyl Estrad 0 each PO DAILY 12/11/17 12/11/17





[Zenchent 0.4 mg-35 Mcg Tablet]   


 


Potassium Chloride [K-Dur 20] 20 meq PO DAILY 12/11/17 12/11/17














Review of Systems





- Physician Review


All systems were reviewed & negative as marked: Yes





- Review of Systems


Constitutional: absent: Fevers


Neurological: Gait Changes (right sided unsteady gait)





Physical Exam


Vital Signs Reviewed: Yes


Vital Signs











  Temp Pulse Resp BP Pulse Ox


 


 12/11/17 19:05  98.2 F  89  16  127/75  100


 


 12/11/17 19:00  98.5 F  85  15  132/65  100


 


 12/11/17 18:30   83  18  144/56 L  100


 


 12/11/17 16:32  98.0 F  96 H  18  124/63  100











Appearance: Positive for: Well-Appearing, Non-Toxic, Comfortable


Pain Distress: None


Mental Status: Positive for: Alert and Oriented X 3





- Systems Exam


Head: Present: Atraumatic, Normocephalic


Pupils: Present: PERRL


Extroacular Muscles: Present: EOMI


Conjunctiva: Present: Normal


Mouth: Present: Moist Mucous Membranes


Neck: Present: Normal Range of Motion


Respiratory/Chest: Present: Clear to Auscultation, Good Air Exchange.  No: 

Respiratory Distress, Accessory Muscle Use


Cardiovascular: Present: Regular Rate and Rhythm, Normal S1, S2.  No: Murmurs


Abdomen: Present: Normal Bowel Sounds.  No: Tenderness, Distention, Peritoneal 

Signs


Back: Present: Normal Inspection


Upper Extremity: Present: Normal Inspection.  No: Cyanosis, Edema


Lower Extremity: Present: Normal Inspection.  No: Edema


Neurological: Present: GCS=15, CN II-XII Intact, Speech Normal


Skin: Present: Warm, Dry, Normal Color.  No: Rashes


Psychiatric: Present: Alert, Oriented x 3, Normal Insight, Normal Concentration





Medical Decision Making


ED Course and Treatment: 


12/11/17 16:21


Impression:


An 81 year old female with right sided unsteady gait.





Plan:


-- EKG


-- CT head


-- chest xray


-- labs


-- Urinalysis


-- Reassess and disposition





Prior Visits:


Notes and results from previous visits were reviewed. Patient was last seen in 

the emergency department on 11/9/17 for evaluation of confusion and right sided 

headache.





Progress Notes:


EKG:


Ordered, reviewed, and independently interpreted the EKG.


Rate :  89 BPM


Rhythm : NSR


Interpretation : sinus rhythm, 1st degree AV block





12/11/17 16:34


CT HEAD WITHOUT CONTRAST


Creator : Guero Perez MD


FINDINGS:


HEMORRHAGE: No intracranial hemorrhage. 


BRAIN: No mass effect or edema.  Evolving subacute-chronic right occipital 

infarct.


Old right frontoparietal infarct.


Underlying senescent change.


In addition cortical atrophy, cerebellar atrophy is mild, approximately 

symmetrical and unchanged. No evidence of brainstem infarction. 


VENTRICLES: Unremarkable. No hydrocephalus. 


CALVARIUM: Unremarkable.


PARANASAL SINUSES: Chronic left maxillary sinusitis.


MASTOID AIR CELLS: Unremarkable as visualized. No inflammatory changes.


IMPRESSION: No acute intracranial hemorrhage, no evidence of new infarct.  


Code stroke protocol:


Study completed 16:17


Radiologist notified 16:17.  However this study was not available for 

interpretation until 16:23


Results conveyed verbally at 16:27


Interpretation finalized and available for review 16:29





12/11/17 17:29


chest xray


Creator : Guero Perez MD


LUNGS: No active pulmonary disease.


PLEURA: No significant pleural effusion identified, no pneumothorax apparent.


CARDIOVASCULAR: No radiographic findings to suggest acute or significant 

cardiovascular disease.


OSSEOUS STRUCTURES: No significant abnormalities.


VISUALIZED UPPER ABDOMEN: Normal.


IMPRESSION: No active disease. No significant interval change compared to the 

prior examination(s).








- Lab Interpretations


Lab Results: 








 12/11/17 16:35 





 12/11/17 16:35 





 Lab Results





12/11/17 16:35: Blood Type O POSITIVE, Antibody Screen Negative, BBK History 

Checked Patient has bt


12/11/17 16:35: Hemoglobin A1c 7.3 H


12/11/17 16:35: Sodium 137, Potassium 3.9, Chloride 104, Carbon Dioxide 20 L, 

Anion Gap 17, BUN 31 H, Creatinine 1.2, Est GFR (African Amer) 52, Est GFR (Non-

Af Amer) 43, Random Glucose 203 H, Calcium 10.0, Total Bilirubin 0.3, AST 24, 

ALT 26, Alkaline Phosphatase 88, Lactate Dehydrogenase 334, Total Creatine 

Kinase 24 L, Troponin I 0.02, Total Protein 7.5, Albumin 4.0, Globulin 3.6, 

Albumin/Globulin Ratio 1.1, Triglycerides 480 H, Cholesterol 152, LDL 

Cholesterol Direct 53, HDL Cholesterol 32


12/11/17 16:35: PT 12.1, INR 1.11 H, APTT 30.8


12/11/17 16:35: WBC 8.5, RBC 3.47 L, Hgb 10.5 L, Hct 31.4 L, MCV 90.5, MCH 30.3

, MCHC 33.4, RDW 15.1 H, Plt Count 190, MPV 9.4, Gran % 75.6 H, Lymph % (Auto) 

13.5 L, Mono % (Auto) 5.9, Eos % (Auto) 4.6, Baso % (Auto) 0.4, Gran # 6.39, 

Lymph # 1.1 L, Mono # 0.5, Eos # 0.4, Baso # 0.03








I have reviewed the lab results: Yes





- RAD Interpretation


Radiology Orders: 








12/11/17 16:11


HEAD W/O (CODE STROKE) [CT] Stat 


CHEST PORTABLE [RAD] Stat 














- EKG Interpretation


Interpreted by ED Physician: Yes


Type: 12 lead EKG





- Medication Orders


Current Medication Orders: 











Discontinued Medications





Aspirin (Aspirin)  325 mg PO STAT STA


   Stop: 12/11/17 17:05


   Last Admin: 12/11/17 17:15  Dose: 325 mg





Clopidogrel Bisulfate (Plavix)  75 mg PO DAILY [facility]


   Last Admin: 12/12/17 09:35  Dose: 75 mg





Fenofibrate (Tricor)  145 mg PO DAILY [facility]


   Last Admin: 12/12/17 09:35  Dose: 145 mg





Furosemide (Lasix)  40 mg PO TID [facility]


   Last Admin: 12/12/17 09:35  Dose: 40 mg





MAR Blood Pressure


 Document     12/12/17 09:35  DEL  (Rec: 12/12/17 09:35  DEL  [id number])


     Blood Pressure


      Blood Pressure (100//90)             130/60





Glimepiride (Amaryl)  4 mg PO BID [facility]


   Last Admin: 12/12/17 09:34  Dose: 4 mg





Sodium Chloride (Sodium Chloride 0.45%)  1,000 mls @ 30 mls/hr IV .Q24H [facility]


   Last Admin: 12/11/17 18:40  Dose: 30 mls/hr





eMAR Start Stop


 Document     12/11/17 18:40  AD  (Rec: 12/11/17 19:12  AD  [id number])


     Intravenous Solution


      Start Date                                 12/11/17


      Start Time                                 19:12





Insulin Human Regular (Humulin R Med)  0 units SC ACHS [facility]


   PRN Reason: Protocol


   Last Admin: 12/12/17 12:51  Dose: 5 units





MAR Blood Glucose


 Document     12/12/17 12:51  DEL  (Rec: 12/12/17 12:51  DEL  [id number])


     Blood Glucose


      Finger Stick Blood Glucose ()        252


Subcutaneous Administrations


 Document     12/12/17 12:51  DEL  (Rec: 12/12/17 12:51  DEL  [id number])


     Charges for Administration


      # of Subcutaneous Administrations          1





Levothyroxine Sodium (Synthroid)  25 mcg PO DAILY [facility]


   Last Admin: 12/12/17 09:35  Dose: 25 mcg





Metformin HCl (Glucophage)  1,000 mg PO BID [facility]


   Last Admin: 12/12/17 09:34  Dose: 1,000 mg





Mupirocin (Bactroban Ointment)  0 gm TOP BID [facility]


   Last Admin: 12/12/17 09:36  Dose: 1 cre











NIHSS Scale ([place])


Time Performed: 16:23





- How Severe is the Stoke


  ** Baseline


Level of Consciousness: 0=Alert


LOC to Questions: 0=Both comments correct


LOC to commands: 0=Obeys both correctly


Best Gaze: 0=Normal


Visual: 0=No visual loss


Facial: 0=Normal


Motor Arm - Left: 0=No drift


Motor Arm - Right: 0=No drift


Motor Leg - Left: 0=No drift


Motor Leg - Right: 0=No drift


Limb Ataxia: 0=Absent


Sensory: 0=Normal


Best Language: 0=No aphasia


Dysarthia: 0=Normal articulation


Extinction & Inattention (Neglect): 0=Normal, no object


Score: 0


Risk Level: No Stroke Risk





rTPA Inclusion/Exclusion





- Refusal of Treatment


Patient Refused Treatment: No





- Inclusion Criteria for Altepase


Patient is 18 years or Older: Yes


The Clinical Diagnosis of Ischemic Stroke That is Causing a Potentially 

Disabling Neurological Deficit: No


Time of Onset is Well Established to be Less Than 270 Minute Before Treatment 

Would Begin: Yes


Risk/Benefit Discussed With Patient/Family Member Present: No





- Warning to TPA With Conditions


Additional Condition (For 3-4.5 Hour Window): Prior Stroke and Diabetes





- Scribe Statement


The provider has reviewed the documentation as recorded by the César Garsia





Provider Scribe Attestation:


All medical record entries made by the Scribe were at my direction and 

personally dictated by me. I have reviewed the chart and agree that the record 

accurately reflects my personal performance of the history, physical exam, 

medical decision making, and the department course for this patient. I have 

also personally directed, reviewed, and agree with the discharge instructions 

and disposition.











Disposition/Present on Arrival





- Present on Arrival


Any Indicators Present on Arrival: No


History of DVT/PE: No


History of Uncontrolled Diabetes: No


Urinary Catheter: No


History Surgical Site Infection Following: None





- Disposition


Have Diagnosis and Disposition been Completed?: Yes


Diagnosis: 


 Ataxia





Disposition: HOSPITALIZED


Disposition Time: 07:00


Condition: STABLE

## 2017-12-11 NOTE — CARD
--------------- APPROVED REPORT --------------





EKG Measurement

Heart [id number]

DC [id number]

[id number]

[id number]

[id number]



<Conclusion>

Sinus rhythm with marked sinus arrhythmia with 1st degree AV block

Minimal voltage criteria for LVH, may be normal variant

Septal infarct, age undetermined

Abnormal ECG

## 2017-12-11 NOTE — CT
PROCEDURE:  CT HEAD WITHOUT CONTRAST.



HISTORY:

Code Stroke



COMPARISON:

11/22/2017 CT head without contrast



11/09/2017 CT head without contrast 



TECHNIQUE:

Axial computed tomography images were obtained through the head/brain 

without intravenous contrast.  



Radiation dose:



Total exam DLP = 726.57 mGy-cm.



This CT exam was performed using one or more of the following dose 

reduction techniques: Automated exposure control, adjustment of the 

mA and/or kV according to patient size, and/or use of iterative 

reconstruction technique.



FINDINGS:



HEMORRHAGE:

No intracranial hemorrhage. 



BRAIN:

No mass effect or edema.  Evolving subacute-chronic right occipital 

infarct.



Old right frontoparietal infarct.



Underlying senescent change.



In addition cortical atrophy, cerebellar atrophy is mild, 

approximately symmetrical and unchanged. No evidence of brainstem 

infarction. 



VENTRICLES:

Unremarkable. No hydrocephalus. 



CALVARIUM:

Unremarkable.



PARANASAL SINUSES:

Chronic left maxillary sinusitis.



MASTOID AIR CELLS:

Unremarkable as visualized. No inflammatory changes.



OTHER FINDINGS:

None.



IMPRESSION:

No acute intracranial hemorrhage, no evidence of new infarct.  



__________________________________________



Code stroke protocol:



Study completed 16:17



Radiologist notified 16:17.  However this study was not available for 

interpretation until 16:23



Results conveyed verbally at 16:27



Interpretation finalized and available for review 16:29

## 2017-12-12 VITALS — DIASTOLIC BLOOD PRESSURE: 66 MMHG | TEMPERATURE: 97.6 F | SYSTOLIC BLOOD PRESSURE: 106 MMHG

## 2017-12-12 VITALS — HEART RATE: 95 BPM

## 2017-12-12 VITALS — RESPIRATION RATE: 19 BRPM

## 2017-12-12 VITALS — OXYGEN SATURATION: 96 %

## 2017-12-12 LAB
ALBUMIN/GLOB SERPL: 1.2 {RATIO} (ref 1.1–1.8)
ALP SERPL-CCNC: 71 U/L (ref 38–126)
ALT SERPL-CCNC: 22 U/L (ref 7–56)
AST SERPL-CCNC: 24 U/L (ref 14–36)
BILIRUB SERPL-MCNC: 0.3 MG/DL (ref 0.2–1.3)
BUN SERPL-MCNC: 23 MG/DL (ref 7–21)
CALCIUM SERPL-MCNC: 10 MG/DL (ref 8.4–10.5)
CHLORIDE SERPL-SCNC: 110 MMOL/L (ref 98–107)
CO2 SERPL-SCNC: 24 MMOL/L (ref 21–33)
ERYTHROCYTE [DISTWIDTH] IN BLOOD BY AUTOMATED COUNT: 15.3 % (ref 11.5–14.5)
GLOBULIN SER-MCNC: 3.1 GM/DL
GLUCOSE SERPL-MCNC: 136 MG/DL (ref 70–110)
HCT VFR BLD CALC: 28.5 % (ref 36–48)
MCH RBC QN AUTO: 29.9 PG (ref 25–35)
MCHC RBC AUTO-ENTMCNC: 33 G/DL (ref 31–37)
MCV RBC AUTO: 90.8 FL (ref 80–105)
PLATELET # BLD: 170 10^3/UL (ref 120–450)
PMV BLD AUTO: 9.6 FL (ref 7–11)
POTASSIUM SERPL-SCNC: 3.7 MMOL/L (ref 3.6–5)
PROT SERPL-MCNC: 6.8 G/DL (ref 5.8–8.3)
SODIUM SERPL-SCNC: 143 MMOL/L (ref 132–148)
WBC # BLD AUTO: 6.5 10^3/UL (ref 4.5–11)

## 2017-12-12 RX ADMIN — INSULIN HUMAN SCH UNITS: 100 INJECTION, SOLUTION PARENTERAL at 12:51

## 2017-12-12 RX ADMIN — INSULIN HUMAN SCH: 100 INJECTION, SOLUTION PARENTERAL at 09:36

## 2017-12-12 NOTE — CP.PCM.CON
<Donna Apodaca - Last Filed: 12/12/17 17:07>





History of Present Illness





- History of Present Illness


History of Present Illness: 


PGY-2 Neurology note for Dr. Pablo's service





81 year old female with past medical history of previous stroke, with left 

visual field deficit, hypertension, diabetes and CHF, was brought in to the 

emergency department complaining of right sided unsteady gait. Patient states 

that while walking to the bathroom she noticed that she was leaning "towards 

the right". Patient called PMD and was sent to the emergency department for 

further evaluation for stroke. Patient denies any similar episodes previously. 

She reports using a walker at her baseline and states that she only walks short 

distances, such as to the bathroom, without any assistance. She states that sh 

e is stable with a walker. She reports had a previous stroke last month, and 

was in a subacute rehab facility. She reports the only deficit is left sided 

visual field which is improving. Patient denies fever, chills, chest pain, 

palpitations, headache, dizziness, sob, or any other complaints at this time.





PMH: previous stroke with left visual field deficit, TIA hypertension, diabetes

, hypothyriodism and CHF


PSH: appendectomy, cholecystectomy


family history: colon cancer


social history: denies smoking, alcohol use, illicit drug use


allergy: hydromorphone and tramadol 








Review of Systems





- Review of Systems


All systems: reviewed and no additional remarkable complaints except (as stated 

in HPI)





Past Patient History





- Infectious Disease


Hx of Infectious Diseases: None





- Tetanus Immunizations


Tetanus Immunization: Unknown





- Past Social History


Smoking Status: Never Smoked





- CARDIAC


Hx Cardiac Disorders: Yes


Hx Angina: No


Hx Cardia Arrhythmia: No


Hx Circulatory Problems: No


Hx Congestive Heart Failure: Yes


Hx Heart Murmur: No


Hx Heart Transplant: No


Hx Hypercholesterolemia: Yes


Hx Hypertension: Yes


Hx Internal Defibrillator: No


Hx Mitral Valve Prolapse: No


Hx Pacemaker: No


Hx Peripheral Edema: No


Hx Peripheral Vascular Disease: No





- PULMONARY


Hx Respiratory Disorders: No


Hx Asthma: No


Hx Bronchitis: No


Hx Chronic Obstructive Pulmonary Disease (COPD): No


Hx Emphysema: No


Hx Pneumonia: No


Hx Respiratory Aspiration: No


Hx Respiratory Tract Infection: No


Hx Sleep Apnea: No


Hx Tuberculosis: No





- NEUROLOGICAL


Hx Neurological Disorder: Yes


Hx Alzheimer's Disease: No


HX Cerebrovascular Accident: Yes (x2)


Hx Dementia: No


Hx Dizziness: Yes


Hx Meningitis: No


Hx Migraine: No


Hx Parkinson's Disease: No


Hx Seizures: No


Hx Transient Ischemic Attacks (TIA): Yes (x1)





- HEENT


Hx HEENT Problems: Yes (blurred vision)


Hx Blind: No


Hx Cataracts: Yes


Hx Deafness: No


Hx Difficulty Chewing: No


Hx Epistaxis: No


Hx Glaucoma: Yes


Hx Macular Degeneration: No





- RENAL


Hx Chronic Kidney Disease: No


Hx Dialysis: No


Hx Kidney Stones: No


Hx Neurogenic Bladder: No


Hx Pyelonephritis: No


Hx Renal (Kidney) Cancer: No


Hx Renal Failure: No





- ENDOCRINE/METABOLIC


Hx Endocrine Disorders: Yes


Hx Adrenal Cancer: No


Hx Diabetes Insipidus: No


Hx Diabetes Mellitus Type 1: No


Hx Diabetes Mellitus Type 2: Yes


Hx Hyperthyroidism: No


Hx Hypothyroidism: Yes


Hx Systemic Lupus Erythematosus: No





- HEMATOLOGICAL/ONCOLOGICAL


Hx Blood Disorders: No


Hx AIDS: No


Hx Anemia: No


Hx Cancer: No


Hx Chemotherapy: No


Hx Cirrhosis: No


Hx Hemophilia: No


Hx Hepatitis A: No


Hx Hepatitis B: No


Hx Hepatitis C: No


Hx Human Immunodeficiency Virus (HIV): No


Hx Metastesis: No


Hx Shingles: No


Hx Sickle Cell Disease: No


Hx Unexplained Bleeding: No





- INTEGUMENTARY


Hx Dermatological Problems: Yes (rash under breasts)


Hx Basil Cell: No


Hx Eczema: No


Hx Melanoma: No


Hx Psoriasis: No


Hx Squamous Cell: No





- MUSCULOSKELETAL/RHEUMATOLOGICAL


Hx Musculoskeletal Disorders: Yes


Hx Arthritis: Yes


Hx Back Pain: No


Hx Degenerative Joint Disease: No


Hx Falls: No


Hx Fractures: No


Hx Gout: No


Hx Herniated Disk: No


Hx Myasthenia Gravis: No


Hx Osteoarthritis: Yes


Hx Osteomyelitis: No


Hx Osteoporosis: No


Hx Rhabdomyolysis: No


Hx Spinal Stenosis: No


Hx Unsteady Gait: No





- GASTROINTESTINAL


Hx Gastrointestinal Disorders: Yes (IBS)


Hx Colostomy: No


Hx Crohn's Disease: No


Hx Diverticulitis: No


Hx Gall Bladder Disease: No


Hx Gastroesophageal Reflux: No


Hx Ileostomy: No


Hx Liver Failure: No


Hx Pancreatitis: No


HX Swallowing Problems: No


Hx Ulcer: No





- GENITOURINARY/GYNECOLOGICAL


Hx Genitourinary Disorders: Yes (burning on urination)


Hx Hematuria: No


Hx Incontinence: Yes


Hx Sexually Transmitted Disorders: No


Hx Urinary Tract Infection: Yes





- PSYCHIATRIC


Hx Psychophysiologic Disorder: No


Hx Anxiety: No


Hx Bipolar Disorder: No


Hx Depression: No


Hx Emotional Abuse: No


Hx Hallucinations: No


Hx Panic Symptoms: No


Hx Paranoia: No


Hx Post Traumatic Stress Disorder: No


Hx Psychosis: No


Hx Physical Abuse: No


Hx Schizophrenia: No


Hx Sexual Abuse: No





- SURGICAL HISTORY


Hx Surgeries: Yes (left benign breast mass removed)


Hx Amputation: No


Hx Appendectomy: Yes


Hx Cardiac Catheterization: No


Hx Cholecystectomy: Yes


Hx Coronary Stent: No


Hx Gastric Bypass Surgery: No


Hx Hysterectomy: No


Hx Joint Replacement: No


Hx Kidney Transplant: No


Hx Liver Transplant: No


Hx Mastectomy: No


Hx Musculoskeletal Surgery: No


Hx Open Heart Surgery: No


Hx Orthopedic Surgery: No


Hx Splenectomy: No


Hx Valve Replacement: No


Other/Comment: colonoscopy x7





- ANESTHESIA


Hx Anesthesia: Yes


Hx Anesthesia Reactions: No


Hx Malignant Hyperthermia: No





Meds


Allergies/Adverse Reactions: 


 Allergies











Allergy/AdvReac Type Severity Reaction Status Date / Time


 


hydromorphone HCl Allergy Severe HALLUCINATI Verified 11/09/17 17:16





[From Dilaudid]   ONS  


 


tramadol Allergy Severe RASH Verified 11/09/17 17:16














- Medications


Medications: 


 Current Medications





Clopidogrel Bisulfate (Plavix)  75 mg PO DAILY [facility]


   Last Admin: 12/12/17 09:35 Dose:  75 mg


Fenofibrate (Tricor)  145 mg PO DAILY [facility]


   Last Admin: 12/12/17 09:35 Dose:  145 mg


Furosemide (Lasix)  40 mg PO TID [facility]


   Last Admin: 12/12/17 09:35 Dose:  40 mg


Glimepiride (Amaryl)  4 mg PO BID [facility]


   Last Admin: 12/12/17 09:34 Dose:  4 mg


Sodium Chloride (Sodium Chloride 0.45%)  1,000 mls @ 30 mls/hr IV .Q24H [facility]


   Last Admin: 12/11/17 18:40 Dose:  30 mls/hr


Insulin Human Regular (Humulin R Med)  0 units SC ACHS [facility]


   PRN Reason: Protocol


   Last Admin: 12/12/17 09:36 Dose:  Not Given


Levothyroxine Sodium (Synthroid)  25 mcg PO DAILY [facility]


   Last Admin: 12/12/17 09:35 Dose:  25 mcg


Metformin HCl (Glucophage)  1,000 mg PO BID [facility]


   Last Admin: 12/12/17 09:34 Dose:  1,000 mg


Mupirocin (Bactroban Ointment)  0 gm TOP BID [facility]


   Last Admin: 12/12/17 09:36 Dose:  1 cre











Physical Exam





- Constitutional


Appears: Well, No Acute Distress





- Head Exam


Head Exam: ATRAUMATIC, NORMAL INSPECTION, NORMOCEPHALIC





- Eye Exam


Eye Exam: EOMI, Normal appearance





- ENT Exam


ENT Exam: Mucous Membranes Moist





- Respiratory Exam


Respiratory Exam: Clear to Auscultation Bilateral, NORMAL BREATHING PATTERN.  

absent: Rhonchi, Wheezes, Respiratory Distress





- Cardiovascular Exam


Cardiovascular Exam: REGULAR RHYTHM, +S1, +S2.  absent: Tachycardia, Diastolic 

murmur, Systolic Murmur





- Extremities Exam


Extremities exam: Positive for: normal inspection.  Negative for: tenderness





- Neurological Exam


Neurological exam: Alert, CN II-XII Intact, Oriented x3





- Expanded Neurological Exam


  ** Expanded


Patient oriented to: person, place, time


Speech: Fluid Speech


Cranial nerves: EOM's Intact: Normal


Cerebellar Function: Finger to Nose: Normal


Neuro motor strength exam: Left Upper Extremity: 5, Right Upper Extremity: 5, 

Left Lower Extremity: 5, Right Lower Extremity: 5





- Skin


Skin Exam: Dry, Intact, Normal Color, Warm





Results





- Vital Signs


Recent Vital Signs: 


 Last Vital Signs











Temp  97.8 F   12/12/17 05:47


 


Pulse  80   12/12/17 05:47


 


Resp  19   12/12/17 05:47


 


BP  130/60   12/12/17 09:35


 


Pulse Ox  96   12/12/17 05:47














- Labs


Result Diagrams: 


 12/12/17 05:30





 12/12/17 05:30


Labs: 


 Laboratory Results - last 24 hr











  12/11/17 12/11/17 12/12/17





  20:06 21:10 05:30


 


WBC   


 


RBC   


 


Hgb   


 


Hct   


 


MCV   


 


MCH   


 


MCHC   


 


RDW   


 


Plt Count   


 


MPV   


 


Sodium    143


 


Potassium    3.7


 


Chloride    110 H


 


Carbon Dioxide    24


 


Anion Gap    13


 


BUN    23 H


 


Creatinine    1.1


 


Est GFR ( Amer)    58


 


Est GFR (Non-Af Amer)    48


 


POC Glucose (mg/dL)   205 H 


 


Random Glucose    136 H


 


Calcium    10.0


 


Total Bilirubin    0.3


 


AST    24


 


ALT    22


 


Alkaline Phosphatase    71


 


NT-Pro-B Natriuret Pep    308


 


Total Protein    6.8


 


Albumin    3.7


 


Globulin    3.1


 


Albumin/Globulin Ratio    1.2


 


TSH 3rd Generation   


 


Urine Color  yellow  


 


Urine Appearance  Clear  


 


Urine pH  6.0  


 


Ur Specific Gravity  1.020  


 


Urine Protein  Trace H  


 


Urine Glucose (UA)  250 H  


 


Urine Ketones  Negative  


 


Urine Blood  Negative  


 


Urine Nitrate  Negative  


 


Urine Bilirubin  Negative  


 


Urine Urobilinogen  0.2  


 


Ur Leukocyte Esterase  Small H  


 


Urine RBC  2 - 5  


 


Urine WBC  5 - 10  


 


Ur Epithelial Cells  6 - 8  














  12/12/17 12/12/17





  05:30 05:30


 


WBC  6.5  D 


 


RBC  3.14 L 


 


Hgb  9.4 L 


 


Hct  28.5 L 


 


MCV  90.8 


 


MCH  29.9 


 


MCHC  33.0 


 


RDW  15.3 H 


 


Plt Count  170 


 


MPV  9.6 


 


Sodium  


 


Potassium  


 


Chloride  


 


Carbon Dioxide  


 


Anion Gap  


 


BUN  


 


Creatinine  


 


Est GFR ( Amer)  


 


Est GFR (Non-Af Amer)  


 


POC Glucose (mg/dL)  


 


Random Glucose  


 


Calcium  


 


Total Bilirubin  


 


AST  


 


ALT  


 


Alkaline Phosphatase  


 


NT-Pro-B Natriuret Pep  


 


Total Protein  


 


Albumin  


 


Globulin  


 


Albumin/Globulin Ratio  


 


TSH 3rd Generation   3.04


 


Urine Color  


 


Urine Appearance  


 


Urine pH  


 


Ur Specific Gravity  


 


Urine Protein  


 


Urine Glucose (UA)  


 


Urine Ketones  


 


Urine Blood  


 


Urine Nitrate  


 


Urine Bilirubin  


 


Urine Urobilinogen  


 


Ur Leukocyte Esterase  


 


Urine RBC  


 


Urine WBC  


 


Ur Epithelial Cells  














Assessment & Plan





- Assessment and Plan (Free Text)


Assessment: 


81 year old female with past medical history of previous stroke, with left 

visual field deficit, hypertension, diabetes and CHF, was brought in to the 

emergency department complaining of right sided unsteady gait most likely due 

to de conditioning and walker dependence 





- CT head showed no acute intracranial hemorrhage, no new infarct


- previous MRI on 11/10/17 showed infarction of right occipital lobe, 

encephalomalacia if left frontal lobe


- MRI ordered


- vit B12, Vit D


- hemocysteine, methylmalonic acid, vit E


- continue plaxiv 75mg, fenofibrate 


- PT/OT





case reviewed and discussed with attending














<Chandan Pablo - Last Filed: 12/12/17 18:03>





Results





- Vital Signs


Recent Vital Signs: 


 Last Vital Signs











Temp  97.6 F   12/12/17 12:00


 


Pulse  95 H  12/12/17 14:00


 


Resp  19   12/12/17 12:00


 


BP  106/66   12/12/17 12:00


 


Pulse Ox  96   12/12/17 05:47














- Labs


Result Diagrams: 


 12/12/17 05:30





 12/12/17 05:30


Labs: 


 Laboratory Results - last 24 hr











  12/11/17 12/11/17 12/12/17





  20:06 21:10 05:30


 


WBC   


 


RBC   


 


Hgb   


 


Hct   


 


MCV   


 


MCH   


 


MCHC   


 


RDW   


 


Plt Count   


 


MPV   


 


Sodium    143


 


Potassium    3.7


 


Chloride    110 H


 


Carbon Dioxide    24


 


Anion Gap    13


 


BUN    23 H


 


Creatinine    1.1


 


Est GFR ( Amer)    58


 


Est GFR (Non-Af Amer)    48


 


POC Glucose (mg/dL)   205 H 


 


Random Glucose    136 H


 


Calcium    10.0


 


Total Bilirubin    0.3


 


AST    24


 


ALT    22


 


Alkaline Phosphatase    71


 


NT-Pro-B Natriuret Pep    308


 


Total Protein    6.8


 


Albumin    3.7


 


Globulin    3.1


 


Albumin/Globulin Ratio    1.2


 


Vitamin B12    460


 


25-OH Vitamin D Total   


 


TSH 3rd Generation   


 


Urine Color  yellow  


 


Urine Appearance  Clear  


 


Urine pH  6.0  


 


Ur Specific Gravity  1.020  


 


Urine Protein  Trace H  


 


Urine Glucose (UA)  250 H  


 


Urine Ketones  Negative  


 


Urine Blood  Negative  


 


Urine Nitrate  Negative  


 


Urine Bilirubin  Negative  


 


Urine Urobilinogen  0.2  


 


Ur Leukocyte Esterase  Small H  


 


Urine RBC  2 - 5  


 


Urine WBC  5 - 10  


 


Ur Epithelial Cells  6 - 8  














  12/12/17 12/12/17 12/12/17





  05:30 05:30 05:30


 


WBC   6.5  D 


 


RBC   3.14 L 


 


Hgb   9.4 L 


 


Hct   28.5 L 


 


MCV   90.8 


 


MCH   29.9 


 


MCHC   33.0 


 


RDW   15.3 H 


 


Plt Count   170 


 


MPV   9.6 


 


Sodium   


 


Potassium   


 


Chloride   


 


Carbon Dioxide   


 


Anion Gap   


 


BUN   


 


Creatinine   


 


Est GFR ( Amer)   


 


Est GFR (Non-Af Amer)   


 


POC Glucose (mg/dL)   


 


Random Glucose   


 


Calcium   


 


Total Bilirubin   


 


AST   


 


ALT   


 


Alkaline Phosphatase   


 


NT-Pro-B Natriuret Pep   


 


Total Protein   


 


Albumin   


 


Globulin   


 


Albumin/Globulin Ratio   


 


Vitamin B12   


 


25-OH Vitamin D Total  < 12.8 L  


 


TSH 3rd Generation    3.04


 


Urine Color   


 


Urine Appearance   


 


Urine pH   


 


Ur Specific Gravity   


 


Urine Protein   


 


Urine Glucose (UA)   


 


Urine Ketones   


 


Urine Blood   


 


Urine Nitrate   


 


Urine Bilirubin   


 


Urine Urobilinogen   


 


Ur Leukocyte Esterase   


 


Urine RBC   


 


Urine WBC   


 


Ur Epithelial Cells   














  12/12/17 12/12/17 12/12/17





  08:35 11:16 12:47


 


WBC   


 


RBC   


 


Hgb   


 


Hct   


 


MCV   


 


MCH   


 


MCHC   


 


RDW   


 


Plt Count   


 


MPV   


 


Sodium   


 


Potassium   


 


Chloride   


 


Carbon Dioxide   


 


Anion Gap   


 


BUN   


 


Creatinine   


 


Est GFR ( Amer)   


 


Est GFR (Non-Af Amer)   


 


POC Glucose (mg/dL)  130 H  198 H  252 H


 


Random Glucose   


 


Calcium   


 


Total Bilirubin   


 


AST   


 


ALT   


 


Alkaline Phosphatase   


 


NT-Pro-B Natriuret Pep   


 


Total Protein   


 


Albumin   


 


Globulin   


 


Albumin/Globulin Ratio   


 


Vitamin B12   


 


25-OH Vitamin D Total   


 


TSH 3rd Generation   


 


Urine Color   


 


Urine Appearance   


 


Urine pH   


 


Ur Specific Gravity   


 


Urine Protein   


 


Urine Glucose (UA)   


 


Urine Ketones   


 


Urine Blood   


 


Urine Nitrate   


 


Urine Bilirubin   


 


Urine Urobilinogen   


 


Ur Leukocyte Esterase   


 


Urine RBC   


 


Urine WBC   


 


Ur Epithelial Cells   














Attending/Attestation





- Attestation


I have personally seen and examined this patient.: Yes


I have fully participated in the care of the patient.: Yes


I have reviewed all pertinent clinical information: Yes

## 2017-12-12 NOTE — MRI
PROCEDURE:  MRI BRAIN WITHOUT CONTRAST



HISTORY:

cva



COMPARISON:

11/10/2017 MRI 



TECHNIQUE:

Multiplanar, multisequence MR images of the brain were obtained 

without intravenous contrast enhancement.



FINDINGS:



HEMORRHAGE:

None



DWI:

No evidence of an acute or early subacute infarction.



BRAIN PARENCHYMA:

No mass effect or edema. There is a recent infarct in the right 

occipital and temporal lobes which was seen in the acute phase on the 

study of 11/10/2017. On the current study the infarct shows focal 

atrophy and a minimal amount of hemosiderin deposition. There is 

minimal restricted diffusion. The findings are consistent with a late 

subacute early chronic infarct.



There is a well-circumscribed white matter infarct in the left 

frontal lobe. This is unchanged



VENTRICLES:

Unremarkable. No hydrocephalus.



CRANIUM:

Unremarkable.



ORBITS:

Grossly unremarkable.



PARANASAL SINUSES/MASTOIDS:

Clear



VASCULAR SYSTEM:

Skull base flow voids intact.



OTHER FINDINGS:

None. 



IMPRESSION:

Subacute or chronic infarct of the right occipital and temporal lobes 

with minimal hemosiderin deposition.



There are no acute infarcts identified

## 2017-12-12 NOTE — HP
HISTORY OF PRESENT ILLNESS:  I know Stella very well from house calls. 

Also, she had a recent stroke and was in subacute rehab, she did well and

she went home.  She comes in with a history of being an 81-year-old female,

called my office today and told me when she walks she was leaning to the

right and she was not feeling well when she was walking, this is new, it

was sudden, and I sent her to the emergency room.  She is here for rule out

CVA with a code stroke called.  She has a big family history.



PAST MEDICAL HISTORY:  She has cardiac disorders, CHF, hypertension,

bronchitis history.  She had a CVA with visual deficit.  She had TIA.  She

uses glasses.  She has cataracts, glaucoma, diabetes, hypothyroidism,

irritable bowel disease, hemorrhoids, frequent urination, appendectomy, and

cholecystectomy.



FAMILY HISTORY:  She has got hypertension and diabetes in the family.



SOCIAL HISTORY:  Never smoked.  No alcohol.  No drugs.



ALLERGIES:  SHE HAS ALLERGIES TO HYDROMORPHONE AND TRAMADOL.



MEDICATIONS:  She takes Plavix, Lasix, Glucophage, Synthroid, vitamin B12,

glimepiride, ProctoFoam cream, Omega-3 fatty acids, Zenchent ,and potassium

replacement.



REVIEW OF SYSTEMS:  No acute vision changes or hearing changes.  She has

poor vision secondary to the stroke she just recently had.  No sore throat.

No neck pain.  No chest pain.  No shortness of breath.  No abdominal pain. 

No nausea, vomiting, constipation, or diarrhea.  No leg pain, but states

when she walks she is now walking to the right side and unsteady gait.



PHYSICAL EXAMINATION:

VITAL SIGNS:  She has temperature 98, 96 pulse, 18 respiratory rate, 124/63

blood pressure, and 100% O2 sat on room air.

GENERAL:  She is well appearing, nontoxic, comfortable, positive alert and

oriented x3.

HEENT:  Head is atraumatic and normocephalic.  Extraocular muscles are

intact.  Throat is moist.

NECK:  Supple.

HEART:  Regular rate.  Normal S1 and S2.

LUNGS:  Decreased breath sounds, but clear to auscultation.

ABDOMEN:  Soft and nontender.  Positive bowel sounds.  Morbidly obese.  No

guarding.  No rebound.  No CVA tenderness.

EXTREMITIES:  A +1/4 pitting edema.  She moves all four extremities.

NEUROLOGIC:  GCS is 15.  Cranial nerves II through XII grossly intact. 

Normal speech.  Tongue is midline.  She close her eyes tight.  She can

she can put her arms over her head, equal strength and moves all four

extremities.

SKIN:  Warm and dry.  No apparent rashes or ulcers.

LYMPHATICS:  Thyroid is midline.  No palpable lymphadenopathy appreciated. 

She is alert and oriented x3 at this time, little nervous about being here.



LABORATORY DATA:  She had multiple tests that were done.  She has a 137

sodium, potassium 3.9, BUN  creatinine 1.2.  I will put her on IV

fluids.  Sugar is 203, I will put her back on medications plus insulin

sliding scale.  Calcium is 10, total bilirubin is 0.3.  AST is 24, ALT is

26, alkaline phosphatase is 88.  Lactate dehydrogenase is 334, total

creatine kinase is 24.  Troponin I is less than 0.02, total protein is 7.5,

albumin is 4, globulin is 3.6.  Cholesterol is 152, triglyceride is

sammy-high at 480, and she has been on fish oil.  HDL is low at 32, INR is

1.11.  White count is 8.5, hemoglobin 10.5, hematocrit 31.4, and platelets

are 190.  She had tests done.  She had a head CT on the stroke protocol, it

showed no acute intracranial hemorrhage or evidence of a new infarct, so no

new infarcts.  There is an old right frontoparietal infarct, nothing new in

the brain, which is good so far.  She had a chest x-ray that showed no

active disease.



So, she is here for changes in her walking, leaning to the right, for

stroke protocol.  We are going to check her labs out very well, have a

Neurology consult.



IMPRESSION:  Cerebrovascular accident or transient ischemic attack.





__________________________________________

Guero Reilly DO





DD:  12/11/2017 18:26:17

DT:  12/11/2017 20:44:00

Job # [id number]





MTDD

## 2017-12-13 LAB — HCYS SERPL-SCNC: 8.6 UMOL/L (ref ?–10.4)

## 2017-12-13 NOTE — DS
HISTORY OF PRESENT ILLNESS:  She is resting comfortable in bed, feeling a

little bit better than when she came.  She still cannot walk correctly. 

Awaiting for physical therapy to let me know if I can get her to TCU or to

subacute rehab.  She might need subacute rehab.  She was just in the

hospital on 11/12/2017 and today is 12/12/2017, it is 30 days since she has

been in the hospital hoping to get her out and there is still a bit work

under the same 30 days admission.  She is in observation.



PHYSICAL EXAMINATION:

VITAL SIGNS:  97.8 temperature, 80 pulse, 136/64 blood pressure, 19

respiratory rate, and 96% O2 saturation on room air.

HEENT:  Head is atraumatic, normocephalic.

HEART:  Regular rate.

LUNGS:  Clear to auscultation.

ABDOMEN:  Soft, obese, and nontender.

EXTREMITIES:  No edema.

SKIN:  She has multiple bug bites, getting antibiotic cream.



MEDICATIONS:  She has Amaryl, Bactroban cream, Glucophage, insulin, Lasix,

Plavix, IV fluids, Synthroid, and Tricor.  She has definitely improved

since she has been here.



LABORATORY DATA:  Sodium 143, potassium 3.7, BUN 23, creatinine 1.1,

better.  GFR is 48, sugar is 136, calcium 10.  Total bilirubin is 0.3, AST

24, ALT 22, and alk phos 71.  BNP is 308, total protein 6.8, and albumin

3.7.  TSH is 3.04.  6.5 white count, 9.4 hemoglobin, 20.5 hematocrit with

170 platelets.



ASSESSMENT AND PLAN:  She was supposed to be seen by Neurology to get their

opinion.  Hopefully, they will come in today.  I am also going to try and

get her to subacute rehab for walking.  I will see what physical therapy

says.  She is in observation.  She has been here for one day and will be

discharged later, so I can pull that off.





__________________________________________

Guero Reilly DO





DD:  12/12/2017 8:36:50

DT:  12/12/2017 9:10:52

Job # [id number]

## 2017-12-14 LAB
A-TOCOPHEROL VIT E SERPL-MCNC: 12.8 MG/L (ref 5.7–19.9)
Lab: 3.4 MG/L (ref ?–4.3)

## 2018-01-19 ENCOUNTER — HOSPITAL ENCOUNTER (EMERGENCY)
Dept: HOSPITAL 42 - ED | Age: 83
Discharge: HOME | End: 2018-01-19
Payer: MEDICARE

## 2018-01-19 VITALS — RESPIRATION RATE: 18 BRPM

## 2018-01-19 VITALS — SYSTOLIC BLOOD PRESSURE: 112 MMHG | HEART RATE: 69 BPM | DIASTOLIC BLOOD PRESSURE: 71 MMHG | TEMPERATURE: 98.7 F

## 2018-01-19 VITALS — BODY MASS INDEX: 36.6 KG/M2

## 2018-01-19 VITALS — OXYGEN SATURATION: 96 %

## 2018-01-19 DIAGNOSIS — B34.9: Primary | ICD-10-CM

## 2018-01-19 DIAGNOSIS — I10: ICD-10-CM

## 2018-01-19 DIAGNOSIS — E03.9: ICD-10-CM

## 2018-01-19 DIAGNOSIS — N39.0: ICD-10-CM

## 2018-01-19 DIAGNOSIS — I50.9: ICD-10-CM

## 2018-01-19 DIAGNOSIS — E11.9: ICD-10-CM

## 2018-01-19 LAB
ALBUMIN SERPL-MCNC: 4 G/DL (ref 3–4.8)
ALBUMIN/GLOB SERPL: 1.1 {RATIO} (ref 1.1–1.8)
ALT SERPL-CCNC: 17 U/L (ref 7–56)
APPEARANCE UR: (no result)
APTT BLD: 31.2 SECONDS (ref 25.1–36.5)
AST SERPL-CCNC: 32 U/L (ref 14–36)
BACTERIA #/AREA URNS HPF: (no result) /[HPF]
BASE EXCESS BLDV CALC-SCNC: 3.2 MMOL/L (ref 0–2)
BASOPHILS # BLD AUTO: 0.02 K/MM3 (ref 0–2)
BASOPHILS NFR BLD: 0.2 % (ref 0–3)
BILIRUB UR-MCNC: NEGATIVE MG/DL
BUN SERPL-MCNC: 18 MG/DL (ref 7–21)
CALCIUM SERPL-MCNC: 9.8 MG/DL (ref 8.4–10.5)
COLOR UR: YELLOW
EOSINOPHIL # BLD: 0.1 10*3/UL (ref 0–0.7)
EOSINOPHIL NFR BLD: 0.8 % (ref 1.5–5)
EPI CELLS #/AREA URNS HPF: (no result) /HPF (ref 0–5)
ERYTHROCYTE [DISTWIDTH] IN BLOOD BY AUTOMATED COUNT: 16.2 % (ref 11.5–14.5)
GFR NON-AFRICAN AMERICAN: 36
GLUCOSE UR STRIP-MCNC: NEGATIVE MG/DL
GRANULOCYTES # BLD: 8.77 10*3/UL (ref 1.4–6.5)
GRANULOCYTES NFR BLD: 82.4 % (ref 50–68)
HGB BLD-MCNC: 9.7 G/DL (ref 12–16)
INR PPP: 1.19 (ref 0.93–1.08)
LEUKOCYTE ESTERASE UR-ACNC: (no result) LEU/UL
LYMPHOCYTES # BLD: 1 10*3/UL (ref 1.2–3.4)
LYMPHOCYTES NFR BLD AUTO: 9.1 % (ref 22–35)
MCH RBC QN AUTO: 30.9 PG (ref 25–35)
MCHC RBC AUTO-ENTMCNC: 33 G/DL (ref 31–37)
MCV RBC AUTO: 93.6 FL (ref 80–105)
MONOCYTES # BLD AUTO: 0.8 10*3/UL (ref 0.1–0.6)
MONOCYTES NFR BLD: 7.5 % (ref 1–6)
PH BLDV: 7.41 [PH] (ref 7.32–7.43)
PH UR STRIP: 6 [PH] (ref 4.7–8)
PLATELET # BLD: 174 10^3/UL (ref 120–450)
PMV BLD AUTO: 9.2 FL (ref 7–11)
PROT UR STRIP-MCNC: 30 MG/DL
PROTHROMBIN TIME: 13.7 SECONDS (ref 9.4–12.5)
RBC # BLD AUTO: 3.14 10^6/UL (ref 3.5–6.1)
RBC # UR STRIP: (no result) /UL
RBC #/AREA URNS HPF: (no result) /HPF (ref 0–2)
SP GR UR STRIP: >= 1.03 (ref 1–1.03)
URINE NITRATE: POSITIVE
UROBILINOGEN UR STRIP-ACNC: 0.2 E.U./DL
VENOUS BLOOD FIO2: 21 %
VENOUS BLOOD GAS PCO2: 45 (ref 40–60)
VENOUS BLOOD GAS PO2: 39 MM/HG (ref 30–55)
WBC # BLD AUTO: 10.6 10^3/UL (ref 4.5–11)
WBC #/AREA URNS HPF: (no result) /HPF (ref 0–6)

## 2018-01-19 PROCEDURE — 80053 COMPREHEN METABOLIC PANEL: CPT

## 2018-01-19 PROCEDURE — 87040 BLOOD CULTURE FOR BACTERIA: CPT

## 2018-01-19 PROCEDURE — 71045 X-RAY EXAM CHEST 1 VIEW: CPT

## 2018-01-19 PROCEDURE — 85730 THROMBOPLASTIN TIME PARTIAL: CPT

## 2018-01-19 PROCEDURE — 82803 BLOOD GASES ANY COMBINATION: CPT

## 2018-01-19 PROCEDURE — 81001 URINALYSIS AUTO W/SCOPE: CPT

## 2018-01-19 PROCEDURE — 87086 URINE CULTURE/COLONY COUNT: CPT

## 2018-01-19 PROCEDURE — 99283 EMERGENCY DEPT VISIT LOW MDM: CPT

## 2018-01-19 PROCEDURE — 93005 ELECTROCARDIOGRAM TRACING: CPT

## 2018-01-19 PROCEDURE — 85025 COMPLETE CBC W/AUTO DIFF WBC: CPT

## 2018-01-19 PROCEDURE — 87804 INFLUENZA ASSAY W/OPTIC: CPT

## 2018-01-19 PROCEDURE — 85610 PROTHROMBIN TIME: CPT

## 2018-01-19 NOTE — RAD
HISTORY:

cough  



COMPARISON:

12/11/2017 



FINDINGS:



LUNGS:

No active pulmonary disease.



PLEURA:

No significant pleural effusion identified, no pneumothorax apparent.



CARDIOVASCULAR:

Normal.



OSSEOUS STRUCTURES:

No significant abnormalities.



VISUALIZED UPPER ABDOMEN:

Normal.



OTHER FINDINGS:

None.



IMPRESSION:

No active disease.

## 2018-01-19 NOTE — CARD
--------------- APPROVED REPORT --------------





EKG Measurement

Heart [id number]

[id number]

[id number]

[id number]

[id number]



<Conclusion>

Sinus rhythm with 1st degree AV block with premature atrial complexes

ASMI, age unknown

Nonspecific T wave abnormality

## 2018-01-19 NOTE — ED PDOC
Arrival/HPI





- General


Chief Complaint: Cough, Cold, Congestion


Time Seen by Provider: 01/19/18 10:56


Historian: Patient, Family





- History of Present Illness


Narrative History of Present Illness (Text): 





01/19/18 


81 year old female w/PMHx of CHF, hypertension, diabetes, and CVA with no 

residual effects, brought into the emergency room by daughter for evaluation of 

cold sx for past 2 days. As per pt, " bodyaches, chills, facial congestion 

developed for past few days". Pt reports, now, have some pain in left ear. As 

per family, PMD Dr. Reilly was called and RX; Tamiflu called in, pt started 

yesterday. Otherwise, pt and family member denies high fever, severe headache, 

dizziness, drooling, dysphagia, dyspnea, SOB, wheezing, palpitation, abd. pain, 

N/V/D, UTI sx. At present time , pt appears awake, alert. comfortable, not in 

any apparent distress.





 was called and pt was seen down in ED. As per , if no 

significant changes on blood work, pt can be discharged with outpt f/u.





Past Medical History





- Provider Review


Nursing Documentation Reviewed: Yes





- Travel History


Have you recently traveled outside [place] w/in the past 3 mons?: No





- Past History


Past History: Non-Contributing





- Infectious Disease


Hx of Infectious Diseases: None





- Tetanus Immunization


Tetanus Immunization: Unknown





- Past Medical History


Past Medical History: No Previous





- Cardiac


Hx Cardiac Disorders: Yes


Hx Congestive Heart Failure: Yes


Hx Hypertension: Yes





- Pulmonary


Hx Respiratory Disorders: Yes


Hx Bronchitis: Yes





- Neurological


Hx Neurological Disorder: Yes


HX Cerebrovascular Accident: Yes (no deficits)


Hx Transient Ischemic Attacks (TIA): Yes





- HEENT


Hx HEENT Disorder: Yes (uses glasses)


Hx Cataracts: Yes (sx)


Hx Glaucoma: Yes





- Renal


Hx Renal Disorder: No





- Endocrine/Metabolic


Hx Endocrine Disorders: Yes


Hx Diabetes Mellitus Type 2: Yes


Hx Hypothyroidism: Yes





- Hematological/Oncological


Hx Blood Transfusions: No





- Integumentary


Hx Dermatological Disorder: No





- Musculoskeletal/Rheumatological


Hx Falls: No





- Gastrointestinal


Hx Gastrointestinal Disorders: Yes (IBS)





- Genitourinary/Gynecological


Hx Genitourinary Disorders: Yes (frequent urination)





- Psychiatric


Hx Emotional Abuse: No


Hx Physical Abuse: No


Hx Substance Use: No





- Past Surgical History


Past Surgical History: Unable to Obtain





- Surgical History


Hx Appendectomy: Yes


Hx Cholecystectomy: Yes





- Anesthesia


Hx Anesthesia: Yes


Hx Anesthesia Reactions: No


Hx Malignant Hyperthermia: No





- Suicidal Assessment


Feels Threatened In Home Enviroment: No





Family/Social History





- Physician Review


Nursing Documentation Reviewed: Yes


Family/Social History: No Known Family HX


Smoking Status: Never Smoked


Hx Alcohol Use: No


Hx Substance Use: No


Hx Substance Use Treatment: No





Allergies/Home Meds


Allergies/Adverse Reactions: 


Allergies





hydromorphone HCl [From Dilaudid] Allergy (Severe, Verified 11/09/17 17:16)


 HALLUCINATIONS


tramadol Allergy (Severe, Verified 11/09/17 17:16)


 RASH








Home Medications: 


 Home Meds











 Medication  Instructions  Recorded  Confirmed


 


Clopidogrel [Plavix] 75 mg PO DAILY 07/05/16 01/19/18


 


Furosemide [Lasix] 40 mg PO TID 07/05/16 01/19/18


 


metFORMIN [glucOPHAGE] 1,000 mg PO BID 07/05/16 01/19/18


 


Levothyroxine [Synthroid] 25 mcg PO DAILY 02/21/17 01/19/18


 


Cyanocobalamin [Vitamin B12 1000 1,000 mcg PO DAILY 08/25/17 01/19/18





mcg Tab]   


 


Hydrocortisone [Procto-Med Hc] 30 gm RC BID 08/25/17 01/19/18


 


Omega-3-Acid Ethyl Esters [OMEGA 3] 1 tab PO BID 08/25/17 01/19/18


 


Potassium Chloride [K-Dur 20] 20 meq PO DAILY 12/11/17 01/19/18


 


Folic Acid [Folic Acid] 1 tab PO DAILY 01/19/18 01/19/18


 


Glimepiride [amaRYL] 1 tab PO BID 01/19/18 01/19/18


 


Meclizine [Meclizine*] 1 tab PO BID PRN 01/19/18 01/19/18














Review of Systems





- Review of Systems


Constitutional: Fatigue


Eyes: Normal.  absent: Vision Changes


ENT: Sore Throat, Rhinorrhea, Sinus Congestion


Respiratory: Cough.  absent: SOB, Sputum, Wheezing


Cardiovascular: Normal.  absent: Chest Pain, Palpitations


Gastrointestinal: Normal.  absent: Abdominal Pain


Genitourinary Female: Normal.  absent: Dysuria


Musculoskeletal: Normal


Skin: Normal.  absent: Rash


Neurological: Normal


Endocrine: Normal


Hemo/Lymphatic: Normal


Psychiatric: Normal





Physical Exam


Vital Signs Reviewed: Yes


Vital Signs











  Temp Pulse Resp BP Pulse Ox


 


 01/19/18 13:35  99.1 F  75  18  108/69  96


 


 01/19/18 12:04   79  18  105/68  96


 


 01/19/18 10:41  100.0 F H  85  18  103/62  95











Temperature: Febrile


Blood Pressure: Normal


Pulse: Regular


Respiratory Rate: Normal


Appearance: Positive for: Well-Appearing, Non-Toxic, Comfortable


Pain Distress: None


Mental Status: Positive for: Alert and Oriented X 3





- Systems Exam


Head: Present: Normocephalic


Conjunctiva: Present: Normal


Ears: Present: NORMAL TM (B/L), Normal Canal (B/L)


Mouth: Present: Moist Mucous Membranes.  No: Drooling


Pharnyx: Present: ERYTHEMA (mild B/L).  No: EXUDATE, TONSILS ENLARGED


Nose (Internal): Present: Rhinorrhea (scant clear B/L with mild congestion)


Neck: Present: Trachea Midline.  No: MIDLINE TENDERNESS


Respiratory/Chest: Present: Clear to Auscultation, Good Air Exchange.  No: 

Respiratory Distress, Accessory Muscle Use, Wheezes, Decreased Breath Sounds


Cardiovascular: Present: Murmurs (holosystolic, 2/6), Normal S1, S2, Irregular 

Rhythm


Abdomen: Present: Normal Bowel Sounds.  No: Tenderness, Distention, Peritoneal 

Signs, Rebound, Guarding


Back: No: CVA Tenderness


Upper Extremity: Present: Normal ROM


Lower Extremity: Present: Normal ROM.  No: Edema, CALF TENDERNESS, Deformity


Neurological: Present: GCS=15, Speech Normal


Skin: Present: Warm, Dry, Normal Color.  No: Rashes


Psychiatric: Present: Alert, Oriented x 3, Normal Insight





Medical Decision Making


ED Course and Treatment: 





01/19/18 


pt was OBS in ED for 3 hours


On re-evaluation, pt is afebrile,  hemodynamicaly stable.


non-toxic.


PuslEOx 95% RA


neck: SUpple, (-) meningeal sign


ENT: No acute findings.


Lungs: CTA B/L, BS equal B/L


Abd: benign.


Neuorlogicaly intact.


Blood work review and appears baseline.


EKG, CXR review and appears normal study, compare to previous ones.


UA (+) nitrates


Influenza A (-)


Pt has clinical findings c/w viral illness, UTI.


Ucx- pending. Abx given.


As per , pt is stable for discharge and outpt f/u now.


results review and discussed with patient and family. UTI appears recurrent, 

chronic, ' was on multiple abs before".


ref. to f/u with  for UCx-return at any time if any worsening or new 

changes. Pt and family Understand and agrees with discharges.




















- Lab Interpretations


Lab Results: 








 01/19/18 11:40 





 01/19/18 13:30 





 Lab Results





01/19/18 14:05: Urine Color Yellow, Urine Appearance Turbid, Urine pH 6.0, Ur 

Specific Gravity >= 1.030, Urine Protein 30 H, Urine Glucose (UA) Negative, 

Urine Ketones Negative, Urine Blood Trace-intact H, Urine Nitrate Positive H, 

Urine Bilirubin Negative, Urine Urobilinogen 0.2, Ur Leukocyte Esterase 

Moderate H, Urine RBC 0 - 2, Urine WBC Tntc, Ur Epithelial Cells Many, Urine 

Bacteria Many


01/19/18 13:30: Sodium 139, Chloride 98, Potassium 3.7, Carbon Dioxide 28, 

Anion Gap 17, BUN 18, Creatinine 1.4 H, Est GFR ( Amer) 44, Est GFR (Non-

Af Amer) 36, Random Glucose 227 H, Calcium 9.8, Total Bilirubin 0.6, AST 32, 

ALT 17, Alkaline Phosphatase 62, Total Protein 7.5, Albumin 4.0, Globulin 3.5, 

Albumin/Globulin Ratio 1.1


01/19/18 11:40: Influenza Typ A,B (EIA) Negative for flu a/b


01/19/18 11:40: pO2 39, VBG pH 7.41, VBG pCO2 45.0, VBG HCO3 28.5 H, VBG Total 

CO2 29.9 H, VBG O2 Sat (Calc) 80.5 H, VBG Base Excess 3.2 H, VBG Potassium 3.7, 

Sodium 139.0, Chloride 100.0, Glucose 220 H, Lactate 2.1, FiO2 21.0, Venous 

Blood Potassium 3.7


01/19/18 11:40: PT 13.7 H, INR 1.19 H, APTT 31.2


01/19/18 11:40: WBC 10.6  D, RBC 3.14 L, Hgb 9.7 L, Hct 29.4 L, MCV 93.6, MCH 

30.9, MCHC 33.0, RDW 16.2 H, Plt Count 174, MPV 9.2, Gran % 82.4 H, Lymph % (

Auto) 9.1 L, Mono % (Auto) 7.5 H, Eos % (Auto) 0.8 L, Baso % (Auto) 0.2, Gran # 

8.77 H, Lymph # 1.0 L, Mono # 0.8 H, Eos # 0.1, Baso # 0.02











- RAD Interpretation


Radiology Orders: 








01/19/18 10:57


CHEST PORTABLE [RAD] Stat 








IMPRESSION:


No active disease.





- EKG Interpretation


EKG Interpretation (Text): 





01/19/18 11:05


SR@92/min, 1st degrees AVB, PACs, no acute ST-T changes.


Interpreted by ED Physician: Yes


Comparison: Similar to previous EKG





- Medication Orders


Current Medication Orders: 








Ceftriaxone Sodium (Rocephin 2 Gm Ivpb)  2 gm in 100 mls @ 100 mls/hr IVPB STAT 

STA


   PRN Reason: Protocol


   Stop: 01/19/18 15:39


   Last Admin: 01/19/18 14:53  Dose: 100 mls/hr





eMAR Start Stop


 Document     01/19/18 14:53  EQ  (Rec: 01/19/18 14:54  EQ  [id number])


     Intravenous Solution


      Start Date                                 01/19/18


      Start Time                                 14:53








Discontinued Medications





Sodium Chloride (Sodium Chloride 0.9%)  1,000 mls @ 250 mls/hr IV .Q4H STA


   Stop: 01/19/18 14:56


   Last Admin: 01/19/18 12:01  Dose: 250 mls/hr





eMAR Start Stop


 Document     01/19/18 12:01  EQ  (Rec: 01/19/18 12:01  EQ  [id number])


     Intravenous Solution


      Start Date                                 01/19/18


      Start Time                                 12:01














Disposition/Present on Arrival





- Present on Arrival


Any Indicators Present on Arrival: No


History of DVT/PE: No


History of Uncontrolled Diabetes: No


Urinary Catheter: No


History of Decub. Ulcer: No


History Surgical Site Infection Following: None





- Disposition


Have Diagnosis and Disposition been Completed?: Yes


Diagnosis: 


 Viral illness, UTI (urinary tract infection)





Disposition: HOME/ ROUTINE


Disposition Time: 13:19


Patient Plan: Discharge


Patient Problems: 


 Current Active Problems











Problem Status Onset


 


Urinary tract infection Acute  


 


Viral illness Acute  











Condition: STABLE


Discharge Instructions (ExitCare):  Urinary Tract Infection in Women (ED), 

Viral Syndrome (ED)


Additional Instructions: 


CONTINUE MEDICATION GIVEN BY  





ENCOURAGE FLUIDS





BEDREST FOR 2-3 DAYS





FOLLOW UP WITH DR. REILLY IN 1-2 DAYS FOR RE-EVALUATION.


RETURN TO ED IF ANY WORSENING OR NEW CHANGES.


Prescriptions: 


Cefdinir [Omnicef] 300 mg PO BID #14 cap


Referrals: 


Guero Reilly DO [Staff Provider] - Follow up with primary


Forms:  [employer] (English)

## 2018-04-16 ENCOUNTER — HOSPITAL ENCOUNTER (OUTPATIENT)
Dept: HOSPITAL 42 - ED | Age: 83
Setting detail: OBSERVATION
LOS: 1 days | Discharge: HOME | End: 2018-04-17
Attending: FAMILY MEDICINE | Admitting: FAMILY MEDICINE
Payer: MEDICARE

## 2018-04-16 VITALS — BODY MASS INDEX: 36.6 KG/M2

## 2018-04-16 DIAGNOSIS — I50.9: ICD-10-CM

## 2018-04-16 DIAGNOSIS — Z79.02: ICD-10-CM

## 2018-04-16 DIAGNOSIS — R42: ICD-10-CM

## 2018-04-16 DIAGNOSIS — E03.9: ICD-10-CM

## 2018-04-16 DIAGNOSIS — Z86.73: ICD-10-CM

## 2018-04-16 DIAGNOSIS — E11.42: ICD-10-CM

## 2018-04-16 DIAGNOSIS — R47.01: ICD-10-CM

## 2018-04-16 DIAGNOSIS — Z79.84: ICD-10-CM

## 2018-04-16 DIAGNOSIS — H40.9: ICD-10-CM

## 2018-04-16 DIAGNOSIS — R41.82: ICD-10-CM

## 2018-04-16 DIAGNOSIS — E78.00: ICD-10-CM

## 2018-04-16 DIAGNOSIS — I35.0: ICD-10-CM

## 2018-04-16 DIAGNOSIS — I11.0: ICD-10-CM

## 2018-04-16 DIAGNOSIS — G45.9: Primary | ICD-10-CM

## 2018-04-16 DIAGNOSIS — R47.1: ICD-10-CM

## 2018-04-16 LAB
ALBUMIN SERPL-MCNC: 4.3 G/DL (ref 3–4.8)
ALBUMIN/GLOB SERPL: 1.4 {RATIO} (ref 1.1–1.8)
ALT SERPL-CCNC: 22 U/L (ref 7–56)
APPEARANCE UR: CLEAR
APTT BLD: 32.3 SECONDS (ref 25.1–36.5)
AST SERPL-CCNC: 23 U/L (ref 14–36)
BASOPHILS # BLD AUTO: 0.04 K/MM3 (ref 0–2)
BASOPHILS NFR BLD: 0.4 % (ref 0–3)
BILIRUB UR-MCNC: NEGATIVE MG/DL
BUN SERPL-MCNC: 24 MG/DL (ref 7–21)
CALCIUM SERPL-MCNC: 9.6 MG/DL (ref 8.4–10.5)
COLOR UR: YELLOW
EOSINOPHIL # BLD: 0.3 10*3/UL (ref 0–0.7)
EOSINOPHIL NFR BLD: 3.1 % (ref 1.5–5)
ERYTHROCYTE [DISTWIDTH] IN BLOOD BY AUTOMATED COUNT: 15.3 % (ref 11.5–14.5)
GFR NON-AFRICAN AMERICAN: 43
GLUCOSE UR STRIP-MCNC: NEGATIVE MG/DL
GRANULOCYTES # BLD: 7.07 10*3/UL (ref 1.4–6.5)
GRANULOCYTES NFR BLD: 74.7 % (ref 50–68)
HDLC SERPL-MCNC: 34 MG/DL (ref 29–60)
HGB BLD-MCNC: 10.1 G/DL (ref 12–16)
INR PPP: 1.05 (ref 0.93–1.08)
LDLC SERPL-MCNC: 54 MG/DL (ref 0–129)
LEUKOCYTE ESTERASE UR-ACNC: NEGATIVE LEU/UL
LYMPHOCYTES # BLD: 1.5 10*3/UL (ref 1.2–3.4)
LYMPHOCYTES NFR BLD AUTO: 15.9 % (ref 22–35)
MCH RBC QN AUTO: 30 PG (ref 25–35)
MCHC RBC AUTO-ENTMCNC: 33 G/DL (ref 31–37)
MCV RBC AUTO: 90.8 FL (ref 80–105)
MONOCYTES # BLD AUTO: 0.6 10*3/UL (ref 0.1–0.6)
MONOCYTES NFR BLD: 5.9 % (ref 1–6)
PH UR STRIP: 5.5 [PH] (ref 4.7–8)
PLATELET # BLD: 180 10^3/UL (ref 120–450)
PMV BLD AUTO: 9.8 FL (ref 7–11)
PROT UR STRIP-MCNC: NEGATIVE MG/DL
PROTHROMBIN TIME: 12.1 SECONDS (ref 9.4–12.5)
RBC # BLD AUTO: 3.37 10^6/UL (ref 3.5–6.1)
RBC # UR STRIP: NEGATIVE /UL
SP GR UR STRIP: 1.01 (ref 1–1.03)
TROPONIN I SERPL-MCNC: 0.03 NG/ML
UROBILINOGEN UR STRIP-ACNC: 0.2 E.U./DL
WBC # BLD AUTO: 9.5 10^3/UL (ref 4.5–11)

## 2018-04-16 PROCEDURE — 97162 PT EVAL MOD COMPLEX 30 MIN: CPT

## 2018-04-16 PROCEDURE — 70450 CT HEAD/BRAIN W/O DYE: CPT

## 2018-04-16 PROCEDURE — 80053 COMPREHEN METABOLIC PANEL: CPT

## 2018-04-16 PROCEDURE — 71045 X-RAY EXAM CHEST 1 VIEW: CPT

## 2018-04-16 PROCEDURE — 70496 CT ANGIOGRAPHY HEAD: CPT

## 2018-04-16 PROCEDURE — 93306 TTE W/DOPPLER COMPLETE: CPT

## 2018-04-16 PROCEDURE — 70551 MRI BRAIN STEM W/O DYE: CPT

## 2018-04-16 PROCEDURE — 99285 EMERGENCY DEPT VISIT HI MDM: CPT

## 2018-04-16 PROCEDURE — 36415 COLL VENOUS BLD VENIPUNCTURE: CPT

## 2018-04-16 PROCEDURE — 86850 RBC ANTIBODY SCREEN: CPT

## 2018-04-16 PROCEDURE — 84484 ASSAY OF TROPONIN QUANT: CPT

## 2018-04-16 PROCEDURE — 83036 HEMOGLOBIN GLYCOSYLATED A1C: CPT

## 2018-04-16 PROCEDURE — 80061 LIPID PANEL: CPT

## 2018-04-16 PROCEDURE — 93005 ELECTROCARDIOGRAM TRACING: CPT

## 2018-04-16 PROCEDURE — 85730 THROMBOPLASTIN TIME PARTIAL: CPT

## 2018-04-16 PROCEDURE — 85610 PROTHROMBIN TIME: CPT

## 2018-04-16 PROCEDURE — 86900 BLOOD TYPING SEROLOGIC ABO: CPT

## 2018-04-16 PROCEDURE — 85025 COMPLETE CBC W/AUTO DIFF WBC: CPT

## 2018-04-16 PROCEDURE — 85027 COMPLETE CBC AUTOMATED: CPT

## 2018-04-16 PROCEDURE — 70498 CT ANGIOGRAPHY NECK: CPT

## 2018-04-16 PROCEDURE — 97116 GAIT TRAINING THERAPY: CPT

## 2018-04-16 PROCEDURE — 81003 URINALYSIS AUTO W/O SCOPE: CPT

## 2018-04-16 PROCEDURE — 82948 REAGENT STRIP/BLOOD GLUCOSE: CPT

## 2018-04-16 RX ADMIN — INSULIN HUMAN SCH: 100 INJECTION, SOLUTION PARENTERAL at 22:10

## 2018-04-16 NOTE — CARD
--------------- APPROVED REPORT --------------





EKG Measurement

Heart [id number]

RI [id number]

[id number]

[id number]

[id number]



<Conclusion>

Sinus rhythm with 1st degree AV block with premature supraventricular 

complexes

Minimal voltage criteria for LVH, may be normal variant

Borderline ECG

## 2018-04-16 NOTE — ED PDOC
Arrival/HPI





- General


Chief Complaint: Altered Mental Status


Time Seen by Provider: 04/16/18 10:38


Historian: Patient, Family (daughter )





- History of Present Illness


Narrative History of Present Illness (Text): 





04/16/18 10:45


82 year old female, whose PMH includes hypertension, CHF, CVA, TIA, diabetes, 

who presents to the emergency department, accompanied by daughter complaining 

of patient being confused and not finishing her sentences. Daughter reports she 

went to patient's room this morning and noticed that her mother was not making 

sense when speaking to her. She notes patient was fine last night anNo other 

complaints were made. 


04/16/18 12:05





Time/Duration: Prior to Arrival


Symptom Onset: Sudden


Symptom Course: Unchanged


Activities at Onset: Rest


Context: Home





Past Medical History





- Provider Review


Nursing Documentation Reviewed: Yes





- Past History


Past History: Non-Contributing





- Infectious Disease


Hx of Infectious Diseases: None





- Tetanus Immunization


Tetanus Immunization: Unknown





- Reproductive


Menopause: No





- Past Medical History


Past Medical History: No Previous





- Cardiac


Hx Cardiac Disorders: Yes


Hx Congestive Heart Failure: Yes


Hx Hypertension: Yes





- Pulmonary


Hx Respiratory Disorders: Yes


Hx Bronchitis: Yes





- Neurological


Hx Neurological Disorder: Yes


HX Cerebrovascular Accident: Yes (no deficits)


Hx Transient Ischemic Attacks (TIA): Yes





- HEENT


Hx HEENT Disorder: Yes (uses glasses)


Hx Cataracts: Yes (sx)


Hx Glaucoma: Yes





- Renal


Hx Renal Disorder: No





- Endocrine/Metabolic


Hx Endocrine Disorders: Yes


Hx Diabetes Mellitus Type 2: Yes


Hx Hypothyroidism: Yes





- Hematological/Oncological


Hx Blood Transfusions: No





- Integumentary


Hx Dermatological Disorder: No





- Musculoskeletal/Rheumatological


Hx Falls: No





- Gastrointestinal


Hx Gastrointestinal Disorders: Yes (IBS)





- Genitourinary/Gynecological


Hx Genitourinary Disorders: Yes (frequent urination)





- Psychiatric


Hx Emotional Abuse: No


Hx Physical Abuse: No


Hx Substance Use: No





- Past Surgical History


Past Surgical History: Unable to Obtain





- Surgical History


Hx Appendectomy: Yes


Hx Cholecystectomy: Yes





- Anesthesia


Hx Anesthesia: Yes


Hx Anesthesia Reactions: No


Hx Malignant Hyperthermia: No





- Suicidal Assessment


Feels Threatened In Home Enviroment: No





Family/Social History





- Physician Review


Nursing Documentation Reviewed: Yes


Family/Social History: Unknown Family HX


Smoking Status: Never Smoked


Hx Alcohol Use: No


Hx Substance Use: No


Hx Substance Use Treatment: No





Allergies/Home Meds


Allergies/Adverse Reactions: 


Allergies





hydromorphone HCl [From Dilaudid] Allergy (Severe, Verified 04/16/18 17:15)


 HALLUCINATIONS


tramadol Allergy (Severe, Verified 04/16/18 17:15)


 RASH








Home Medications: 


 Home Meds











 Medication  Instructions  Recorded  Confirmed


 


Clopidogrel [Plavix] 75 mg PO DAILY 07/05/16 04/16/18


 


Furosemide [Lasix] 40 mg PO TID 07/05/16 04/16/18


 


metFORMIN [glucOPHAGE] 1,000 mg PO BID 07/05/16 04/16/18


 


Levothyroxine [Synthroid] 25 mcg PO DAILY 02/21/17 04/16/18


 


Cyanocobalamin [Vitamin B12 1000 1,000 mcg PO DAILY 08/25/17 04/16/18





mcg Tab]   


 


Hydrocortisone [Procto-Med Hc] 30 gm RC BID PRN 08/25/17 04/16/18


 


Omega-3-Acid Ethyl Esters [OMEGA 3] 1 tab PO BID 08/25/17 04/16/18


 


Potassium Chloride [K-Dur 20] 20 meq PO DAILY 12/11/17 04/16/18


 


Folic Acid [Folic Acid] 1 tab PO DAILY 01/19/18 04/16/18


 


Glimepiride [amaRYL] 1 tab PO BID 01/19/18 04/16/18


 


Meclizine [Meclizine*] 1 tab PO BID PRN 01/19/18 04/16/18


 


Lipase/Protease/Amylase [Zenpep 1 ecc PO AC 04/16/18 04/16/18





[id number] U]   














Review of Systems





- Physician Review


All systems were reviewed & negative as marked: Yes





- Review of Systems


Respiratory: absent: SOB


Cardiovascular: absent: Chest Pain


Neurological: Speech Changes, Other (receptive aphasia)





Physical Exam


Vital Signs Reviewed: Yes


Vital Signs











  Temp Pulse Resp BP Pulse Ox


 


 04/16/18 13:13   79  18  144/82  97


 


 04/16/18 12:28   96 H  16  137/81  96


 


 04/16/18 11:17  98.3 F  84  21  139/76  97


 


 04/16/18 10:30  98.2 F  77  18  140/82  99











Temperature: Afebrile


Blood Pressure: Normal


Pulse: Regular


Respiratory Rate: Normal


Appearance: Positive for: Well-Appearing, Non-Toxic, Comfortable


Pain Distress: None


Mental Status: Positive for: Alert and Oriented X 3





- Systems Exam


Head: Present: Atraumatic, Normocephalic


Pupils: Present: PERRL


Extroacular Muscles: Present: EOMI


Conjunctiva: Present: Normal


Respiratory/Chest: Present: Clear to Auscultation, Good Air Exchange.  No: 

Respiratory Distress, Accessory Muscle Use, Wheezes, Rales, Rhonchi


Cardiovascular: Present: Regular Rate and Rhythm, Normal S1, S2.  No: Murmurs


Neurological: Present: GCS=15, CN II-XII Intact, Speech Normal, Normal Sensory 

Function, Other (receptive aphasia ).  No: Memory Normal


Skin: Present: Warm, Dry, Normal Color.  No: Rashes


Psychiatric: Present: Alert.  No: Oriented x 3 (oriented x 2)





Medical Decision Making


ED Course and Treatment: 





04/16/18 


Impression:


82 year old female with receptive aphasia and oriented x2 who came in 

accompanied by daughter complaining of patient being confused and no finishing 

her sentences. 





Plan:


-- CT Head/neck


-- EKG


-- Labs


-- Chest X-ray


-- Sodium Chloride 


-- Urinalysis 


-- Reassess and disposition








Progress Notes:





04/16/2018  


CODE STROKE: called at 10:50 





04/16/18 11:10


EKG:


Ordered, reviewed, and independently interpreted the EKG.


Rate : 77 BPM


Rhythm : NSR


Interpretation : No ST-segment elevations or depressions, no T-wave inversions, 

normal intervals.


Comparison : No previous EKG for comparison. 





04/16/18 13:00


Head CT: Creator : Malia Edwards MD


COMPARISON:Noncontrast head CT from 12/11/2017. MRI brain without contrast from 

12/12/2017. 


FINDINGS:


HEMORRHAGE: No intracranial hemorrhage. 


BRAIN: Again seen is cystic encephalomalacia with old petechial hemorrhage in 

the right occipital lobe.  There is an old infarction in the left frontal lobe. 

There are old infarctions in the right cerebellar hemisphere. There is an old 

lacunar infarction in the right basal ganglia. There are mild chronic 

microangiopathic changes.  There is no mass, mass effect or abnormal extra-

axial fluid collection. 


VENTRICLES: There is moderate age-related global parenchymal volume loss and 

proportionate enlargement of the ventricles and cortical sulci. 


CALVARIUM: The skull base and calvarium are normal.


PARANASAL SINUSES: There is a large retention cyst/ polyp in the right 

maxillary sinus and severe mucosal thickening in the left posterior ethmoid air 

cells and mild mucosal thickening in the left inferior frontal sinus.


MASTOID AIR CELLS: Predominantly clear.


OTHER FINDINGS: None.


IMPRESSION: 1. No acute intracranial abnormality. If there is a persistent 

focal neurologic deficit and an ongoing clinical concern for acute infarction, 

an MRI of the brain without intravenous contrast would be a more sensitive 

modality for evaluation of hyperacute/acute ischemic infarction. 2. Old 

infarctions in the right PCA, right PICA and left MCA territorries. 3. Mild 

chronic microangiopathic changes and moderate age-related global parenchymal 

volume loss. Important findings were discussed with Dr. Andrea Llanos on 04/16/ 2018 at 11:15 a.m.











04/16/18 18:03


case discusse dwizahra velasquez stroke, not tpa candidate as wake up 

symptoms. symptoms resolvied in er. asa 81 mg given. accepted by dr martinez. 





- Lab Interpretations


Lab Results: 








 04/16/18 11:09 





 04/16/18 11:09 





 Lab Results





04/16/18 11:09: Blood Type O POSITIVE, Antibody Screen Negative, BBK History 

Checked Patient has bt


04/16/18 11:09: Hemoglobin A1c 6.9 H


04/16/18 11:09: Sodium 142, Potassium 4.1, Chloride 101, Carbon Dioxide 25, 

Anion Gap 20, BUN 24 H, Creatinine 1.2, Est GFR (African Amer) 52, Est GFR (Non-

Af Amer) 43, Random Glucose 170 H, Calcium 9.6, Total Bilirubin 0.2, AST 23, 

ALT 22, Alkaline Phosphatase 70, Troponin I 0.03  D, Total Protein 7.4, Albumin 

4.3, Globulin 3.1, Albumin/Globulin Ratio 1.4, Triglycerides 476 H, Cholesterol 

168, LDL Cholesterol Direct 54, HDL Cholesterol 34


04/16/18 11:09: PT 12.1, INR 1.05, APTT 32.3


04/16/18 11:09: WBC 9.5, RBC 3.37 L, Hgb 10.1 L, Hct 30.6 L, MCV 90.8, MCH 30.0

, MCHC 33.0, RDW 15.3 H, Plt Count 180, MPV 9.8, Gran % 74.7 H, Lymph % (Auto) 

15.9 L, Mono % (Auto) 5.9, Eos % (Auto) 3.1, Baso % (Auto) 0.4, Gran # 7.07 H, 

Lymph # (Auto) 1.5, Mono # (Auto) 0.6, Eos # (Auto) 0.3, Baso # (Auto) 0.04








I have reviewed the lab results: Yes





- RAD Interpretation


Radiology Orders: 








04/16/18 10:51


CTA HEAD/NECK CODE STROKE [CT] Stat 


HEAD W/O (CODE STROKE) [CT] Stat 


CHEST PORTABLE [RAD] Stat 











: Radiologist





- EKG Interpretation


Interpreted by ED Physician: Yes


Type: 12 lead EKG





- Medication Orders


Current Medication Orders: 








Atorvastatin Calcium (Lipitor)  10 mg PO DIN REBEKAH


Clopidogrel Bisulfate (Plavix)  75 mg PO DAILY REBEKAH


Fenofibrate (Tricor)  145 mg PO DAILY REBEKAH


Folic Acid (Folic Acid)  1 mg PO DAILY REBEKAH


Furosemide (Lasix)  40 mg PO TID [facility]


   Last Admin: 04/16/18 18:30  Dose: 40 mg





MAR Blood Pressure


 Document     04/16/18 18:30  KAA  (Rec: 04/16/18 18:30  [place] [id number])


     Blood Pressure


      Blood Pressure (100//90)             142/74





Gabapentin (Neurontin)  300 mg PO TID [facility]


   PRN Reason: Protocol


   Last Admin: 04/16/18 18:30  Dose: 300 mg





Behavioural


 Document     04/16/18 18:30  KAA  (Rec: 04/16/18 18:31  [place] [address])


     Maintenance


      Maintenance Dose                           Yes


     Nonmedicinal


      Nonmedicinal Interventions                 Therapeutic Communication


Re-Assess: Reassess Psych Meds


 Document     04/16/18 19:30  FC  (Rec: 04/16/18 20:53  FC  [id number])


      Reassess Psych Med                         Effective





Glimepiride (Amaryl)  1 mg PO BID REBEKAH


Hydrocortisone (Anusol-Hc)  30 gm TOP BID PRN


   PRN Reason: Constipation


Sodium Chloride (Sodium Chloride 0.9%)  1,000 mls @ 30 mls/hr IV .Q24H [facility]


   Last Admin: 04/16/18 18:32  Dose: 30 mls/hr





eMAR Start Stop


 Document     04/16/18 18:32  KA  (Rec: 04/16/18 18:32  [facility]25)


     Intravenous Solution


      Start Date                                 04/16/18


      Start Time                                 18:32





Insulin Human Regular (Humulin R Med)  0 units SC ACHS REBEKAH


   PRN Reason: Protocol


   Last Admin: 04/16/18 22:10 Dose:  Not Given


   Non-Admin Reason: Blood Sugar Parameter





MAR Blood Glucose


 Document     04/16/18 22:10  FC  (Rec: 04/16/18 22:10  FC  [id number])


     Blood Glucose


      Finger Stick Blood Glucose ()        163





Levothyroxine Sodium (Synthroid)  25 mcg PO DAILY REBEKAH


Meclizine HCl (Antivert)  25 mg PO BID PRN


   PRN Reason: Dizziness


Metformin HCl (Glucophage)  1,000 mg PO BID REBEKAH


   Last Admin: 04/16/18 18:30  Dose: 1,000 mg





Potassium Chloride (K-Dur 20 Meq Er Tab)  20 meq PO DAILY REBEKAH





Discontinued Medications





Aspirin (Aspirin Chewable)  81 mg PO STAT STA


   Stop: 04/16/18 11:35


   Last Admin: 04/16/18 12:01  Dose: 81 mg





Sodium Chloride (Sodium Chloride 0.9%)  1,000 mls @ 100 mls/hr IV .Q10H [facility]


   Last Admin: 04/16/18 11:15  Dose: 100 mls/hr





eMAR Start Stop


 Document     04/16/18 11:15  OCS  (Rec: 04/16/18 12:02  OCS  [id number])


     Intravenous Solution


      Start Date                                 04/16/18


      Start Time                                 11:15





Non-Formulary Medication (Omega-3-Acid Ethyl Esters [Omega 3])  1 tab PO BID [facility]


   Last Admin: 04/16/18 18:23  Dose:  





Pneumococcal Polyvalent Vaccine (Pneumovax 23 Vaccine)  0.5 ml IM .ONCE ONE


   Stop: 04/16/18 17:57











NIHSS Scale ([place])


Time Performed: 12:05





- How Severe is the Stoke


  ** Baseline


Level of Consciousness: 0=Alert


LOC to Questions: 1=One correct


LOC to commands: 0=Obeys both correctly


Best Gaze: 0=Normal


Visual: 0=No visual loss


Facial: 0=Normal


Motor Arm - Left: 0=No drift


Motor Arm - Right: 0=No drift


Motor Leg - Left: 0=No drift


Motor Leg - Right: 0=No drift


Limb Ataxia: 0=Absent


Sensory: 0=Normal


Best Language: 1=Mild to moderate aphasia


Dysarthia: 0=Normal articulation


Extinction & Inattention (Neglect): 0=Normal, no object


Score: 2


Risk Level: Minor Stroke Risk





rTPA Inclusion/Exclusion





- Refusal of Treatment


Patient Refused Treatment: No





- Inclusion Criteria for Altepase


Patient is 18 years or Older: Yes


The Clinical Diagnosis of Ischemic Stroke That is Causing a Potentially 

Disabling Neurological Deficit: No


Time of Onset is Well Established to be Less Than 270 Minute Before Treatment 

Would Begin: No


Risk/Benefit Discussed With Patient/Family Member Present: Yes





- Scribe Statement


The provider has reviewed the documentation as recorded by the Gunjanibe





Jennifer Mcdowell





Provider Scribe Attestation:


All medical record entries made by the Scribe were at my direction and 

personally dictated by me. I have reviewed the chart and agree that the record 

accurately reflects my personal performance of the history, physical exam, 

medical decision making, and the department course for this patient. I have 

also personally directed, reviewed, and agree with the discharge instructions 

and disposition. 





Disposition/Present on Arrival





- Present on Arrival


Any Indicators Present on Arrival: No


History of DVT/PE: No


History of Uncontrolled Diabetes: No


Urinary Catheter: No


History of Decub. Ulcer: No


History Surgical Site Infection Following: None





- Disposition


Have Diagnosis and Disposition been Completed?: Yes


Diagnosis: 


 CVA (cerebral vascular accident)





Disposition: HOSPITALIZED


Disposition Time: 08:34


Patient Problems: 


 Current Active Problems











Problem Status Onset


 


CVA (cerebral vascular accident) Acute  











Condition: FAIR

## 2018-04-16 NOTE — CON
DATE:  04/16/2018



CARDIOLOGY CONSULTATION



HISTORY OF PRESENT ILLNESS:  Patient is an 82-year-old woman who presents

with transient dysarthria today.



The patient's past medical history includes prior stroke, diabetes

mellitus, hypertension.  She denies chest pain, denies shortness of breath.

She was placed on Plavix in the past because of thoughts of peripheral

vascular disease.



Currently, the patient still has some dysarthria, which is which has not

been completely cleared up.



SOCIAL HISTORY:  Denies smoking.



REVIEW OF SYSTEMS:  Fourteen-point review of systems was reviewed in

detail.  No angina, no dyspnea.  No edema in the lower extremities.  No

loss of consciousness.  No history of syncope.  No PND, no orthopnea.



PHYSICAL EXAMINATION:

GENERAL:  The patient is in no acute distress.

VITAL SIGNS:  Blood pressure is 144/82, the heart rate is in the 80s.

NECK:  Negative JVD.

LUNGS:  Without rales.

HEART:  Reveals a 3/6 systolic ejection murmur at the base of the heart.

EXTREMITIES:  Without clubbing, cyanosis, or edema.



LABORATORY DATA:  Hemoglobin is 10.1.  Chemistries:  BUN and creatinine are

unremarkable.  Troponin is 0.03.  CTA of the neck reveals no significant

carotid disease.  EKG reveals first-degree heart block with no acute

changes.



IMPRESSION:

1.  Transient dysarthria.

2.  History of cerebrovascular accident in the past.

3.  Diabetes mellitus.

4.  Hypertension.

5.  Hypercholesterolemia.

6.  Transient ischemic attack.

7.  Aortic valve stenosis versus sclerosis.



Given these findings, the patient will be on telemetry monitor for the next

24 hours.  We will order an echocardiogram to evaluate her LV function as

well as her heart valve.





__________________________________________

Kory Baptiste MD



DD:  04/16/2018 15:17:36

DT:  04/16/2018 15:22:20

Job # [id number]

## 2018-04-16 NOTE — CT
PROCEDURE:  CT HEAD WITHOUT CONTRAST.



HISTORY:

Code Stroke



COMPARISON:

Noncontrast head CT from 12/11/2017. MRI brain without contrast from 

12/12/2017. 



TECHNIQUE:

Axial computed tomography images were obtained through the head/brain 

without intravenous contrast.  



Radiation dose:



Total exam DLP = 854.51 mGy-cm.



This CT exam was performed using one or more of the following dose 

reduction techniques: Automated exposure control, adjustment of the 

mA and/or kV according to patient size, and/or use of iterative 

reconstruction technique.



FINDINGS:



HEMORRHAGE:

No intracranial hemorrhage. 



BRAIN:

Again seen is cystic encephalomalacia with old petechial hemorrhage 

in the right occipital lobe.  There is an old infarction in the left 

frontal lobe. There are old infarctions in the right cerebellar 

hemisphere. There is an old lacunar infarction in the right basal 

ganglia. There are mild chronic microangiopathic changes.  There is 

no mass, mass effect or abnormal extra-axial fluid collection. 



VENTRICLES:

There is moderate age-related global parenchymal volume loss and 

proportionate enlargement of the ventricles and cortical sulci. 



CALVARIUM:

The skull base and calvarium are normal.



PARANASAL SINUSES:

There is a large retention cyst/ polyp in the right maxillary sinus 

and severe mucosal thickening in the left posterior ethmoid air cells 

and mild mucosal thickening in the left inferior frontal sinus.



MASTOID AIR CELLS:

Predominantly clear.



OTHER FINDINGS:

None.



IMPRESSION:

1. No acute intracranial abnormality. If there is a persistent focal 

neurologic deficit and an ongoing clinical concern for acute 

infarction, an MRI of the brain without intravenous contrast would be 

a more sensitive modality for evaluation of hyperacute/acute ischemic 

infarction. 



2. Old infarctions in the right PCA, right PICA and left MCA 

territorries.



3. Mild chronic microangiopathic changes and moderate age-related 

global parenchymal volume loss. 



Important findings were discussed with Dr. Andrea Llanos on 04/16/2018 

at 11:15 a.m.

## 2018-04-16 NOTE — MRI
EXAM:

  MR Head Without Intravenous Contrast



CLINICAL HISTORY:

  82 years old, female; Signs and symptoms; Syncope and collapse; Additional 

info: AMS, R/O CVA



TECHNIQUE:

  Magnetic resonance images of the head/brain without intravenous contrast in 

multiple planes.



COMPARISON:

  MR - BRAIN WITHOUT CONTRAST 2017-12-12 11:35



FINDINGS:

  Brain:  Moderate atrophy.  No intracranial hemorrhage.  No mass.  Mild 

encephalomalacia within right occipital region.  Minimal encephalomalacia 

within left posterior frontal region.  Scattered foci of high T2 to signal 

within periventricular and subcortical white matter.  Few scattered punctate 

foci of resected diffusion within right parietal, left parietal, left 

occipital, right cerebellar regions. Serpentine linear restricted diffusion 

within right occipital region about encephalomalacia.

  Ventricles:  No hydrocephalus.

  Bones/joints:  No acute fracture.

  Sinuses:  Scattered minimal mucosal thickening.  RIGHT maxillary retention 

cyst.  

  Mastoid air cells:  No significant mastoid effusion.

  Orbits:  Unremarkable as visualized.



IMPRESSION:     

1.  Scattered acute lacunar infarcts.  More focal acute infarct within right 

occipital region.

2.  Chronic ischemic white matter changes.

3.  Incidental/non-acute findings are described above.

## 2018-04-16 NOTE — CT
PROCEDURE:  CTA HEAD AND NECK WITH CONTRAST



HISTORY:

code stroke



COMPARISON:

Noncontrast head CT from 04/16/2018 and MRI brain without contrast 

from 12/12/2017. 



TECHNIQUE:

Initial noncontrast head CT was performed. Subsequently, CT angiogram 

of the head and neck were performed after the intravenous 

administration of 80 mL of Omnipaque 350.  Contiguous 1.5mm thick 

images were obtained in the axial plane of the neck. 2-D coronal and 

sagittal MPR images were obtained. Imaging postprocessing was 

performed with 3-D images also obtained. A delayed contrast head CT 

was also obtained. This CT exam was performed using one or more of 

the following dose reduction techniques: Automated exposure control, 

adjustment of the mA and/or kV according to patient size, and/or use 

of iterative reconstruction technique.



Contrast dose: 150 mL Visipaque 320



Radiation dose:



Total exam DLP = 713.68 mGy-cm.



FINDINGS:



HEAD:

Right:



 The intracranial internal carotid artery, and anterior and middle 

cerebral arteries are widely patent.



Left:



The intracranial internal carotid artery, and anterior and middle 

cerebral arteries are widely patent. 



Posterior circulation: 



The visualized intracranial vertebral arteries, basilar artery and 

posterior cerebral arteries are widely patent. The right vertebral 

artery is dominant, an anatomic variant with 



Ther is no endoluminal filling defect to suggest thrombus. There is 

no intracranial saccular aneurysm.



NECK:

There is a three vessel aortic arch. There is no stenosis at the 

origins of the great vessels at the level of the aortic arch.



There is retropharyngeal course of internal carotid arteries and 

carotid bifurcations, an anatomic variant. 



Right Carotid:



On the right, the common carotid, internal carotid and external 

carotid arteries are widely patent.



There is no hemodynamically significant stenosis in the internal 

carotid artery by NASCET criteria.



Left Carotid:



On the left, the common carotid, internal carotid and external 

carotid arteries are widely patent.



There is no hemodynamically significant stenosis in the internal 

carotid artery by NASCET criteria.



The vertebral arteries are widely patent. The right vertebral artery 

is dominant, an anatomic variant.



The visualized soft tissues of the neck are normal. The visualized 

brain and cervical spine are within normal limits.



The lung apices are clear. Multinodular thyroid gland. 



IMPRESSION:

No evidence of occlusion, endoluminal thrombus or hemodynamically 

significant stenosis in the internal carotid arteries by NASCET 

criteria.



Essentially normal CT of the head and neck. 



Multinodular thyroid gland, if not already performed, dedicated 

thyroid ultrasound on a non emergent basis is recommended for 

complete evaluation of the thyroid gland.

## 2018-04-16 NOTE — RAD
HISTORY:

Code Stroke  



COMPARISON:

01/19/2018. 



FINDINGS:



LUNGS:

The lungs are well inflated and clear.



PLEURA:

No significant pleural effusion identified, no pneumothorax apparent.



CARDIOVASCULAR:

Normal.



OSSEOUS STRUCTURES:

No significant abnormalities.



VISUALIZED UPPER ABDOMEN:

Normal.



OTHER FINDINGS:

None.



IMPRESSION:

No active pulmonary disease.

## 2018-04-17 VITALS — DIASTOLIC BLOOD PRESSURE: 60 MMHG | SYSTOLIC BLOOD PRESSURE: 130 MMHG

## 2018-04-17 VITALS — RESPIRATION RATE: 20 BRPM | TEMPERATURE: 98.5 F | OXYGEN SATURATION: 96 % | HEART RATE: 68 BPM

## 2018-04-17 LAB
ALBUMIN SERPL-MCNC: 3.8 G/DL (ref 3–4.8)
ALBUMIN/GLOB SERPL: 1.2 {RATIO} (ref 1.1–1.8)
ALT SERPL-CCNC: 25 U/L (ref 7–56)
AST SERPL-CCNC: 21 U/L (ref 14–36)
BUN SERPL-MCNC: 19 MG/DL (ref 7–21)
CALCIUM SERPL-MCNC: 9.3 MG/DL (ref 8.4–10.5)
ERYTHROCYTE [DISTWIDTH] IN BLOOD BY AUTOMATED COUNT: 15.3 % (ref 11.5–14.5)
GFR NON-AFRICAN AMERICAN: 48
HGB BLD-MCNC: 9.6 G/DL (ref 12–16)
MCH RBC QN AUTO: 30.7 PG (ref 25–35)
MCHC RBC AUTO-ENTMCNC: 33.9 G/DL (ref 31–37)
MCV RBC AUTO: 90.4 FL (ref 80–105)
PLATELET # BLD: 172 10^3/UL (ref 120–450)
PMV BLD AUTO: 9.8 FL (ref 7–11)
RBC # BLD AUTO: 3.13 10^6/UL (ref 3.5–6.1)
WBC # BLD AUTO: 6.7 10^3/UL (ref 4.5–11)

## 2018-04-17 RX ADMIN — INSULIN HUMAN SCH: 100 INJECTION, SOLUTION PARENTERAL at 10:25

## 2018-04-17 NOTE — CARD
--------------- APPROVED REPORT --------------





EXAM: Two-dimensional and M-mode echocardiogram with Doppler and 

color Doppler.



INDICATION

CVA/TIA Aortic Valve Disease



2D DIMENSIONS 

IVSd1.1   (0.7-1.1cm)LVDd4.6   (3.9-5.9cm)

LVOT Diameter2.0   (1.8-2.4cm)PWd1.2   (0.7-1.1cm)

LVDs3.2   (2.5-4.0cm)FS (%) 29.4   %

LVEF (%)56.4   (>50%)



M-Mode DIMENSIONS 

Aortic Root2.80   (2.2-3.7cm)Aortic Cusp Exc.0.70   (1.5-2.0cm)



Aortic Valve

AoV Peak Jgxxfpgr379.0cm/sAoV VTI54.8cmAO Peak GR.32mmHg

LVOT Peak Ivlhkrmo110.0cm/sLVOT VTI23.60cmAO Mean GR.16mmHg

ANAM (VMAX)1.65gp7AZF (VTI)1.35cm2



Mitral Valve

MV E Vtadopvq27.5cm/sMV A Hwubhwft693.0cm/sE/A ratio0.8



TDI

E/Lateral E'0.0E/Medial E'0.0



Tricuspid Valve

TR Peak Croxkrhl896wr/sRAP YMAAJUJR04fpRdNL Peak Gr.29mmHg

ANKP02ulMw



 LEFT VENTRICLE 

The left ventricle is normal size. There is normal left ventricular 

wall thickness. The left ventricular function is normal. The left 

ventricular ejection fraction is within the normal range. There is 

normal LV segmental wall motion. Transmitral Doppler flow pattern is 

Grade I-abnormal relaxation pattern.



 RIGHT VENTRICLE 

The right ventricle is normal size. There is normal right ventricular 

wall thickness. The right ventricular systolic function is normal.



 ATRIA 

The left atrium size is normal. The right atrium size is normal.



 AORTIC VALVE 

The aortic valve is moderately sclerotic. No aortic regurgitation is 

present. There is moderate valvular aortic stenosis.



 MITRAL VALVE 

The mitral valve is moderately thickened. There is no mitral valve 

regurgitation noted. There is no mitral valve stenosis.



 TRICUSPID VALVE 

There is mild pulmonary hypertension.



 GREAT VESSELS 

The aortic root is normal in size.



 PERICARDIAL EFFUSION 

There is a trace circumferential pericardial effusion.



<Conclusion>

The left ventricle is normal size.

There is normal left ventricular wall thickness.

The left ventricular function is normal.

The left ventricular ejection fraction is within the normal range.

There is normal LV segmental wall motion.

Transmitral Doppler flow pattern is Grade I-abnormal relaxation 

pattern.

There is moderate valvular aortic stenosis.

There is mild pulmonary hypertension.

## 2018-04-17 NOTE — CON
DATE:  04/17/2018



NEUROLOGY CONSULT



CHIEF COMPLAINT:  Altered mental status.



HISTORY OF PRESENT ILLNESS:  This is an 82-year-old woman with past medical

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

region in the past, history of type 2 diabetes mellitus, history of

diabetic peripheral neuropathy, who came in to the hospital because of her

daughter was complaining that she has been confused and not finishing her

sentences and was dysarthric and her speech was not making any sense. 

There was no focal weakness on the extremities except for some mild left

finger tap slowing compared to the right.  She underwent an MRI of the

brain, which showed scattered acute lacunar infarcts especially in the

right parietal, left parietal and left occipital and right cerebellar

regions, superimposed underlying encephalomalacia in the right occipital

region, which most likely seems embolic in nature.  Her CT angio of the

neck shows no significant hemodynamic stenosis.  Currently, she is sitting

up and doing well.  Her speech is much better.  She is back to her

baseline.  Blood pressures are stable.  She was on Plavix before.



ALLERGIES:  ALLERGIC TO HYDROMORPHONE AND TRAMADOL.



REVIEW OF SYSTEMS:  Fourteen-point review of systems is negative except for

the HPI.



PAST MEDICAL HISTORY:  Significant for the old right occipital infarct,

CHF, TIA, diabetes, diabetic peripheral neuropathy.



MEDICATIONS:  Reviewed by nurses' reconciliation sheet.



SOCIAL HISTORY:  No illicit drug use, smoking or EtOH abuse.



PHYSICAL EXAMINATION:

VITAL SIGNS:  Temperature 98.5, pulse rate 68, blood pressure 124/56,

respiratory rate of 20, oxygen saturation 96%.

GENERAL:  The patient is sitting up in the chair, in no acute distress.

HEENT:  Atraumatic, normocephalic.  PERRLA.  Extraocular muscles intact.

NECK:  Supple.  No JVD.  No adenopathy noted.

LUNGS:  Clear to auscultation.  No adventitious sounds.

HEART:  S1 and S2.  Normal rate and rhythm.  No murmurs, rubs or gallops.

ABDOMEN:  Soft, nontender and nondistended.  Bowel sounds are present.

EXTREMITIES:  No clubbing.  No cyanosis.  Peripheral pulses are 2+ felt

bilaterally.

NEUROLOGIC:  The patient is alert and oriented to person, place, month and

year.  Speech is fluent except for mild dysarthria.  No aphasia noted. 

Cranial nerves II through XII intact.  Motor exam:  Moves all extremities

equally.  Mild reduced left finger tap, sort of weakness when compared to

the right.  Sensory exam:  Decreased light touch and pinprick up to the

calves bilaterally.  Decreased vibration of the toes.  DTRs are 2+

throughout and 1 at the ankles.  Coordination:  Finger-to-nose intact.  No

dysmetria noted.  Gait is deferred for now.



LABORATORY DATA:  Sodium is 138, potassium 3.6, chloride 101, carbon

dioxide 28, BUN of 19, creatinine 1.1, random glucose of 143.  A1c 6.9,

triglycerides 476, LDL is 54.



ASSESSMENT AND PLAN:  This is an 82-year-old woman with history of old

right occipital infarct, cerebrovascular accident, transient ischemic

attack, congestive heart failure, diabetes, also has evidence of diabetic

peripheral neuropathy on neurologic exam, who presented with change in

altered mental status in terms of being confused, could not finish her

sentences and has some dysarthria.  She was found to have scattered acute

lacunar infarcts in the right parietal, left parietal, left occipital and

right cerebellar regions superimposed underlying old right occipital

stroke, which is all consistent with an embolic phenomena.  At this time,

recommend,

1.  A 2D echocardiogram.

2.  Given that it is an embolic in nature, we will consider Eliquis 2.5 mg

p.o. b.i.d. plus baby aspirin of 81 mg plus Lipitor 40 for stroke

prevention.

3.  PT/OT evaluation.

4.  Follow up with Cardiology's recommendations in regards to the embolic

phenomena and continue with current present medical management.



Thank you for this consult.





__________________________________________

Chandan Pablo MD

 



DD:  04/17/2018 9:54:56

DT:  04/17/2018 10:36:01

Job # [id number]

## 2018-04-17 NOTE — HP
HISTORY OF PRESENT ILLNESS:  I know Stella very well from the house calls

on her for many years.  She is a nice 82-year-old female who presented to

the emergency room with confusion, accompanied by her daughter, not

finishing her sentences and at this time, I asked her to tell me who I am,

but she cannot come out with my name.



PAST MEDICAL HISTORY:  She has a past medical history of hypertension, CHF,

CVA, TIA, diabetes and is mildly confused, but can answer some questions,

has some expressive aphasia.  She has CHF, hypertension, bronchitis

history,  CVA, TIA in the past.  Wears glasses.  Had cataract surgery,

glaucoma, type 2 diabetes, hypothyroidism, irritable bowel syndrome,

urinary tract issues with frequent urination, appendectomy,

cholecystectomy.



SOCIAL HISTORY:  No smoking.  No alcohol.  No drugs.



FAMILY HISTORY:  Hypertension and diabetes in the family.



ALLERGIES:  HYDROMORPHONE AND TRAMADOL.



MEDICATIONS: She is on Plavix, Lasix, Glucophage, Synthroid, vitamin B12,

 omega-3, potassium, folic acid, Amaryl, meclizine and lipase.



REVIEW OF SYSTEMS:  No acute vision or hearing changes.  No sore throat,

cannot express well.  No chest pain or palpitations.  No shortness of

breath or cough.  No abdominal pain, nausea, vomiting, constipation,

diarrhea.  She has receptive aphasia, some expressive aphasia.  Skin, for

the most part, is intact.



PHYSICAL EXAMINATION:

VITAL SIGNS:  She has 98.3 temperature, 96 pulse, 16 respiratory rate,

137/81 blood pressure, 96% O2 sat on room air.

HEENT:  Head is atraumatic, normocephalic.  She is well appearing, little

bit confused and concerned.  Alert and oriented x3.  Extraocular muscles

are intact.   Pupils equal and reactive to light and accommodation.  Throat

is moist.

NECK:  Supple.

HEART:  Regular rate.  Normal S1, S2.

LUNGS:  Decreased breath sounds, but clear to auscultation bilaterally.  No

wheezes, no rhonchi.  No rales.

ABDOMEN:  Soft, nontender.  Positive bowel sounds.  No guarding, no

rebound, no CVA tenderness.

NEUROLOGIC:  GCS is 15.  Cranial nerves II through XII are grossly intact. 

Normal speech, but neurologically, she cannot understand; while I asked to

her who I am, she cannot express my name, but she tells me she knows who I

am.  Memory is fair.

SKIN:  Warm and dry.  Thyroid nonpalpable, centered.



LABORATORY DATA:  She had multiple tests.  She has a urine that is clean. 

142 sodium, potassium 4.1, BUN 24, creatinine 1.2, GFR is 43, sugar is 178.

Hemoglobin A1c is 6.9, calcium is 9.6, total bili is 0.2, AST is 23, ALT is

22, alk phos is  with troponin I of 0.03, total protein 7.4, albumin

is 4.3, globulin 3.1, triglycerides of 476, cholesterol is 168, LDL is 64,

1.05 INR.  She has a 9.5 white count, 10.1 hemoglobin, 30.6 hematocrit with

180 platelets.



CAT scan of the head and head and neck CTA were okay.  Chest x-ray was

okay.  Waiting for an MRI.  She clearly has something going on, whether it

is TIA a complete stroke, awaiting on that.  She has been on her

medications.  She had got aspirin already, neurological consult with Dr. Pablo, Cardiology consult.  We will continue with aggressive treatment and

care options as ordered, physical therapy is ordered.  IV fluids.



IMPRESSION:  She is here with change in mentation and expressive aphasia,

rule out cerebrovascular accident.  She is in observation.





__________________________________________

Guero Reilly DO



DD:  04/16/2018 17:59:25

DT:  04/16/2018 18:05:28

Job # [id number]

MTDD

## 2018-04-17 NOTE — PN
DATE:  04/17/2018



CARDIOLOGY FOLLOWUP



SUBJECTIVE:  The patient is now distressed.



PHYSICAL EXAMINATION:

VITAL SIGNS:  Blood pressure is 130/60, heart rate is in the 60s.

NECK:  Negative JVD.

LUNGS:  Without rales.

HEART:  Reveals 3/6 systolic ejection murmur.

EXTREMITIES:  Without edema.



LABORATORY DATA:  Preliminary echocardiogram reveals mild aortic stenosis

with the valve area of 1.2 to 1.4.  Hemoglobin is 9.6.  BUN and creatinine

is 19 and 1.9 with a glucose 143.



IMPRESSION:

1.  Transient ischemic attack.

2.  History of cerebrovascular accident in the past.

3.  Mild aortic stenosis.

4.  Hypertension.

5.  Diabetes mellitus.



PLAN:  Given these findings, I agree with Neuro's evaluation.  There is no

intrinsic cardiac cause of her dizziness.





__________________________________________

Kory Baptiste MD



DD:  04/17/2018 14:18:01

DT:  04/17/2018 14:31:19

Job # [id number]

## 2018-04-18 NOTE — DS
HISTORY OF PRESENT ILLNESS:  I saw her sitting out of bed to chair this

morning, eating her breakfast.  She is much more alert.  She knew me right

away.  Yesterday, she could not figure out who I was.  She feels as much

better than when she came in.  She is in better spirits.  She had

expressive aphasia, change in mentation.  She told me when she went out to

eat, this happened, but she is doing much better now.  Awaiting for

Neurology to see her and Physical Therapy to see her.  Clinically, she

looks better, in no pain.



PHYSICAL EXAMINATION:

VITAL SIGNS:  She has a 98.8 temp, 85 pulse, 130/60 blood pressure, 18

respiratory rate and 100% O2 sat on room air.

HEENT:  Head is atraumatic, normocephalic.  Extraocular muscles are intact.

Throat is moist.  Tongue midline.

NECK:  Supple.

HEART:  Regular rate.

LUNGS:  Decreased breath sounds, but clear.

ABDOMEN:  Soft, nontender, positive bowel sounds, obese.

EXTREMITIES:  No edema.  She is able to get up and walk, going for physical

therapy, it is unclear how she is doing.



MEDICATIONS:  She is on Amaryl, Antivert, Anusol, folic acid, Glucophage,

insulin, potassium, Lasix, Lipitor, Neurontin, omega, Plavix, IV fluids and

Synthroid.



LABORATORY DATA:  She has a white count of 6.7, hemoglobin 9.6, hematocrit

28.3, platelets of 172.  She has a 138 sodium, potassium 3.6, BUN 19,

creatinine 1.1, GFR is 48, sugar is 141, calcium is 9.3, total bili is 0.4.

AST is 21, ALT is 25, alk phos 66, total protein is 6.9, triglycerides was

476 and cholesterol was 468.



PLAN:  I am going to add TriCor to her list of medicines because Omega is

with the triglycerides.  I am hoping after Physical Therapy sees her

and Neuro sees her, we can discharge her home with home physical therapy if

needed, I am not sure.  I do think she has improved since yesterday.





_________________________________________Ofelia Reilly DO



DD:  04/17/2018 8:11:46

DT:  04/17/2018 9:44:05

[facility] # [id number]

MTDD

## 2019-03-01 ENCOUNTER — HOSPITAL ENCOUNTER (EMERGENCY)
Dept: HOSPITAL 42 - ED | Age: 84
Discharge: HOME | End: 2019-03-01
Payer: MEDICARE

## 2019-03-01 VITALS
DIASTOLIC BLOOD PRESSURE: 59 MMHG | SYSTOLIC BLOOD PRESSURE: 121 MMHG | OXYGEN SATURATION: 99 % | RESPIRATION RATE: 16 BRPM | HEART RATE: 78 BPM

## 2019-03-01 VITALS — BODY MASS INDEX: 36.6 KG/M2

## 2019-03-01 VITALS — TEMPERATURE: 98.6 F

## 2019-03-01 DIAGNOSIS — R04.0: Primary | ICD-10-CM

## 2019-03-01 NOTE — ED PDOC
Arrival/HPI





- General


Chief Complaint: ENT Problem


Time Seen by Provider: 03/01/19 20:36


Historian: Patient





- History of Present Illness


Narrative History of Present Illness (Text): 


03/01/19 20:44


Stella Ibanez is an 83 year old female, whose past medical history includes 

hypertension, CHF, CVA, TIA, diabetes, hypothyroidism, IBS, appendectomy, and 

cholecystectomy, who presents to the Emergency department complaining of epist

axis. Patient states she has been experiencing a runny nose recently and was 

wiping her nose when she had sudden onset nose bleeding from the right nostril. 

Patient applied pressure to the area and notes some clots. Patient denies any 

fever, chills, headache, dizziness, nausea, vomiting, or any other complaints. 


Symptom Onset: Sudden


Symptom Course: Unchanged


Activities at Onset: Light


Context: Home





Past Medical History





- Provider Review


Nursing Documentation Reviewed: Yes





- Past History


Past History: Non-Contributing





- Infectious Disease


Hx of Infectious Diseases: None





- Tetanus Immunization


Tetanus Immunization: Unknown





- Past Medical History


Past Medical History: No Previous





- Cardiac


Hx Cardiac Disorders: Yes


Hx Congestive Heart Failure: Yes


Hx Hypertension: Yes





- Pulmonary


Hx Respiratory Disorders: Yes


Hx Bronchitis: Yes





- Neurological


HX Cerebrovascular Accident: Yes (no deficits)





- HEENT


Hx HEENT Disorder: Yes (uses glasses)


Hx Cataracts: Yes (sx)


Hx Glaucoma: Yes





- Renal


Hx Renal Disorder: No





- Endocrine/Metabolic


Hx Diabetes Mellitus Type 2: Yes


Hx Hypothyroidism: Yes





- Hematological/Oncological


Hx Blood Transfusions: No





- Integumentary


Hx Dermatological Disorder: No





- Musculoskeletal/Rheumatological


Hx Falls: No





- Gastrointestinal


Hx Gastrointestinal Disorders: Yes (IBS)





- Genitourinary/Gynecological


Hx Genitourinary Disorders: Yes (frequent urination)





- Psychiatric


Hx Emotional Abuse: No


Hx Physical Abuse: No


Hx Substance Use: No





- Past Surgical History


Past Surgical History: Unable to Obtain





- Surgical History


Hx Appendectomy: Yes


Hx Cholecystectomy: Yes





- Anesthesia


Hx Anesthesia: Yes


Hx Anesthesia Reactions: No


Hx Malignant Hyperthermia: No





- Suicidal Assessment


Feels Threatened In Home Enviroment: No





Family/Social History





- Physician Review


Nursing Documentation Reviewed: Yes


Family/Social History: Unknown Family HX


Smoking Status: Never Smoked


Hx Alcohol Use: No


Hx Substance Use: No


Hx Substance Use Treatment: No





Allergies/Home Meds


Allergies/Adverse Reactions: 


Allergies





hydromorphone HCl [From Dilaudid] Allergy (Severe, Verified 03/01/19 20:36)


   HALLUCINATIONS


tramadol Allergy (Severe, Verified 03/01/19 20:36)


   RASH








Home Medications: 


                                    Home Meds











 Medication  Instructions  Recorded  Confirmed


 


Clopidogrel [Plavix] 75 mg PO DAILY 07/05/16 03/01/19


 


Furosemide [Lasix] 40 mg PO TID 07/05/16 03/01/19


 


metFORMIN [glucOPHAGE] 1,000 mg PO BID 07/05/16 03/01/19


 


Levothyroxine [Synthroid] 25 mcg PO DAILY 02/21/17 03/01/19


 


Omega-3-Acid Ethyl Esters [OMEGA 3] 1 tab PO BID 08/25/17 03/01/19


 


Potassium Chloride [K-Dur 20 mEq 20 meq PO DAILY 12/11/17 03/01/19





ER Tab]   


 


Folic Acid 1 tab PO DAILY 01/19/18 03/01/19


 


Meclizine [Meclizine*] 1 tab PO BID PRN 01/19/18 03/01/19


 


Lipase/Protease/Amylase [Zenpep Dr 1 ecc PO AC 04/16/18 03/01/19





15,000 Unit Capsule]   


 


Glimepiride [amaRYL] 4 mg PO BID 03/01/19 03/01/19














Review of Systems





- Physician Review


All systems were reviewed & negative as marked: Yes





- Review of Systems


Constitutional: Normal.  absent: Fevers


Eyes: Normal


ENT: Epistaxis


Respiratory: Normal.  absent: SOB, Cough


Cardiovascular: Normal.  absent: Chest Pain


Gastrointestinal: Normal.  absent: Abdominal Pain, Diarrhea, Nausea, Vomiting


Genitourinary Female: Normal.  absent: Dysuria, Frequency, Hematuria, Urine 

Output Changes


Musculoskeletal: Normal.  absent: Back Pain, Neck Pain


Skin: Normal.  absent: Rash


Neurological: Normal.  absent: Headache, Dizziness


Endocrine: Normal


Hemo/Lymphatic: Normal


Psychiatric: Normal





Physical Exam


Vital Signs Reviewed: Yes





Vital Signs











  Temp Pulse Resp BP Pulse Ox


 


 03/01/19 20:37  98.6 F  85  18  148/66  100











Temperature: Afebrile


Blood Pressure: Normal


Pulse: Regular


Respiratory Rate: Normal


Appearance: Positive for: Well-Appearing, Non-Toxic, Comfortable


Pain Distress: None


Mental Status: Positive for: Alert and Oriented X 3





- Systems Exam


Head: Present: Atraumatic, Normocephalic


Pupils: Present: PERRL


Extroacular Muscles: Present: EOMI


Conjunctiva: Present: Normal


Ears: Present: Normal, NORMAL TM, Normal Canal.  No: Erythema, TM Bulging, 

Fluid, TM Perf


Mouth: Present: Moist Mucous Membranes


Pharnyx: Present: Normal.  No: ERYTHEMA, EXUDATE, TONSILS ENLARGED, Peritonsilar

 Swelling, Uvular Deviation, Muffled/Hoarse Voice, Strider, Soft Palate/Uvular 

Edema


Nose (External): Present: Atraumatic


Nose (Internal): Present: Epistaxis (Oozing blood in right anterior nare)


Neck: Present: Normal Range of Motion


Respiratory/Chest: Present: Clear to Auscultation, Good Air Exchange.  No: 

Respiratory Distress, Accessory Muscle Use


Cardiovascular: Present: Regular Rate and Rhythm, Normal S1, S2.  No: Murmurs


Abdomen: No: Tenderness, Distention, Peritoneal Signs


Back: Present: Normal Inspection


Upper Extremity: Present: Normal Inspection.  No: Cyanosis, Edema


Lower Extremity: Present: Normal Inspection.  No: Edema


Neurological: Present: GCS=15, CN II-XII Intact, Speech Normal


Skin: Present: Warm, Dry, Normal Color.  No: Rashes


Psychiatric: Present: Alert, Oriented x 3, Normal Insight, Normal Concentration





Medical Decision Making


ED Course and Treatment: 


03/01/19 20:44


Impression:


83 year old female complaining of epistaxis from right nostril prior to arrival.





Plan:


-- Silve Nitrate Cautery


-- Reassess and disposition





Prior Visits:


Notes and results from previous visits were reviewed. 





Progress Notes:


PROCEDURE: EPISTAXIS MANAGEMENT


Performed by the emergency provider


Consent: Informed consent was obtained after discussion of the risks, benefits, 

and alternatives to


the procedure.


Timeout: A timeout to verify the correct patient, procedure, and site was 

performed immediately


prior to the procedure.


Indication: Nasal bleeding control


Location: right naris


Bleeding Source: ANTERIOR


Cautery: Silver Nitrate


Post-procedure: Slight ooze from right nare, will pack the nare. Patient was 

observed following procedure and no repeat


episode of bleeding was noted. Patient tolerated the procedure well with no 

immediate


complications.





03/01/19 21:13


PROCEDURE: EPISTAXIS MANAGEMENT


Performed by the emergency provider


Consent: Informed consent was obtained after discussion of the risks, benefits, 

and alternatives to


the procedure.


Timeout: A timeout to verify the correct patient, procedure, and site was 

performed immediately


prior to the procedure.


Indication: Nasal bleeding control


Location: right naris


Bleeding Source: ANTERIOR


Packing: Petroleum gauze


Post-procedure: Good hemostasis. The patient was observed following procedure 

and no repeat


episode of bleeding was noted. Patient tolerated the procedure well with no 

immediate


complications.





- Scribe Statement


The provider has reviewed the documentation as recorded by the César Ortiz





Provider Scribe Attestation:


All medical record entries made by the Scribe were at my direction and 

personally dictated by me. I have reviewed the chart and agree that the record 

accurately reflects my personal performance of the history, physical exam, 

medical decision making, and the department course for this patient. I have also

 personally directed, reviewed, and agree with the discharge instructions and 

disposition.








Disposition/Present on Arrival





- Present on Arrival


Any Indicators Present on Arrival: No


History of DVT/PE: No


History of Uncontrolled Diabetes: No


Urinary Catheter: No


History of Decub. Ulcer: No


History Surgical Site Infection Following: None





- Disposition


Have Diagnosis and Disposition been Completed?: Yes


Diagnosis: 


 Epistaxis





Disposition: HOME/ ROUTINE


Disposition Time: 22:56


Patient Plan: Discharge


Condition: STABLE


Discharge Instructions (ExitCare):  Nosebleeds (DC)


Additional Instructions: 


Maintain nasal packing/avoid sneezing /take meds as prescribed/follow up with 

your ENT Dr.Bastianelli muñiz.


Prescriptions: 


Amoxicillin [Amoxil 500 mg Cap] 500 mg PO TID #21 cap


Referrals: 


Guero Reilly DO [Primary Care Provider] - Follow up with primary


Chava Pollack DO [Staff Provider] - Follow up with primary


Forms:  Talko (English)

## 2019-03-16 ENCOUNTER — HOSPITAL ENCOUNTER (INPATIENT)
Dept: HOSPITAL 42 - ED | Age: 84
LOS: 3 days | Discharge: HOME HEALTH | DRG: 639 | End: 2019-03-19
Attending: FAMILY MEDICINE | Admitting: FAMILY MEDICINE
Payer: MEDICARE

## 2019-03-16 VITALS — BODY MASS INDEX: 36.6 KG/M2

## 2019-03-16 DIAGNOSIS — Z87.440: ICD-10-CM
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
## 2019-03-16 DIAGNOSIS — I11.0: ICD-10-CM

## 2019-03-16 DIAGNOSIS — T25.032A: ICD-10-CM

## 2019-03-16 DIAGNOSIS — Z79.890: ICD-10-CM

## 2019-03-16 DIAGNOSIS — K58.9: ICD-10-CM

## 2019-03-16 DIAGNOSIS — L97.529: ICD-10-CM

## 2019-03-16 DIAGNOSIS — E66.01: ICD-10-CM
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
## 2019-03-16 DIAGNOSIS — I50.9: ICD-10-CM

## 2019-03-16 DIAGNOSIS — E03.9: ICD-10-CM

## 2019-03-16 DIAGNOSIS — E11.65: ICD-10-CM

## 2019-03-16 DIAGNOSIS — L03.032: ICD-10-CM

## 2019-03-16 DIAGNOSIS — I48.91: ICD-10-CM

## 2019-03-16 DIAGNOSIS — E11.621: Primary | ICD-10-CM

## 2019-03-16 DIAGNOSIS — H40.9: ICD-10-CM

## 2019-03-16 DIAGNOSIS — E11.40: ICD-10-CM

## 2019-03-16 DIAGNOSIS — Z86.73: ICD-10-CM
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
## 2019-03-16 LAB
ALBUMIN SERPL-MCNC: 3.6 G/DL (ref 3–4.8)
ALBUMIN/GLOB SERPL: 1 {RATIO} (ref 1.1–1.8)
ALT SERPL-CCNC: 7 U/L (ref 7–56)
APTT BLD: 40.1 SECONDS (ref 26.9–38.3)
AST SERPL-CCNC: 15 U/L (ref 14–36)
BASOPHILS # BLD AUTO: 0.02 K/MM3 (ref 0–2)
BASOPHILS NFR BLD: 0.2 % (ref 0–3)
BUN SERPL-MCNC: 27 MG/DL (ref 7–21)
CALCIUM SERPL-MCNC: 9 MG/DL (ref 8.4–10.5)
EOSINOPHIL # BLD: 0.3 10*3/UL (ref 0–0.7)
EOSINOPHIL NFR BLD: 3.7 % (ref 1.5–5)
ERYTHROCYTE [DISTWIDTH] IN BLOOD BY AUTOMATED COUNT: 15.6 % (ref 11.5–14.5)
ERYTHROCYTE [SEDIMENTATION RATE] IN BLOOD: 62 MM/HR (ref 0–20)
GFR NON-AFRICAN AMERICAN: 39
HGB BLD-MCNC: 9.4 G/DL (ref 12–16)
INR PPP: 1.24
LYMPHOCYTES # BLD: 0.9 10*3/UL (ref 1.2–3.4)
LYMPHOCYTES NFR BLD AUTO: 11 % (ref 22–35)
MCH RBC QN AUTO: 28.3 PG (ref 25–35)
MCHC RBC AUTO-ENTMCNC: 32 G/DL (ref 31–37)
MCV RBC AUTO: 88.6 FL (ref 80–105)
MONOCYTES # BLD AUTO: 0.5 10*3/UL (ref 0.1–0.6)
MONOCYTES NFR BLD: 6.3 % (ref 1–6)
PLATELET # BLD: 163 10^3/UL (ref 120–450)
PMV BLD AUTO: 10.2 FL (ref 7–11)
PROTHROMBIN TIME: 13.8 SECONDS (ref 9.4–12.5)
RBC # BLD AUTO: 3.32 10^6/UL (ref 3.5–6.1)
WBC # BLD AUTO: 8.5 10^3/UL (ref 4.5–11)

## 2019-03-16 RX ADMIN — INSULIN HUMAN SCH: 100 INJECTION, SOLUTION PARENTERAL at 22:00
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
## 2019-03-17 LAB
ALBUMIN SERPL-MCNC: 3.3 G/DL (ref 3–4.8)
ALBUMIN/GLOB SERPL: 1 {RATIO} (ref 1.1–1.8)
ALT SERPL-CCNC: < 6 U/L (ref 7–56)
AST SERPL-CCNC: 18 U/L (ref 14–36)
BUN SERPL-MCNC: 24 MG/DL (ref 7–21)
CALCIUM SERPL-MCNC: 8.9 MG/DL (ref 8.4–10.5)
ERYTHROCYTE [DISTWIDTH] IN BLOOD BY AUTOMATED COUNT: 15.7 % (ref 11.5–14.5)
GFR NON-AFRICAN AMERICAN: 39
HGB BLD-MCNC: 8.5 G/DL (ref 12–16)
MCH RBC QN AUTO: 27.9 PG (ref 25–35)
MCHC RBC AUTO-ENTMCNC: 31.6 G/DL (ref 31–37)
MCV RBC AUTO: 88.2 FL (ref 80–105)
PLATELET # BLD: 159 10^3/UL (ref 120–450)
PMV BLD AUTO: 9.3 FL (ref 7–11)
RBC # BLD AUTO: 3.05 10^6/UL (ref 3.5–6.1)
WBC # BLD AUTO: 8.6 10^3/UL (ref 4.5–11)

## 2019-03-17 RX ADMIN — INSULIN HUMAN SCH U: 100 INJECTION, SOLUTION PARENTERAL at 11:33

## 2019-03-17 RX ADMIN — INSULIN HUMAN SCH: 100 INJECTION, SOLUTION PARENTERAL at 22:40

## 2019-03-17 RX ADMIN — POTASSIUM CHLORIDE SCH MEQ: 20 TABLET, EXTENDED RELEASE ORAL at 10:13

## 2019-03-17 RX ADMIN — INSULIN HUMAN SCH U: 100 INJECTION, SOLUTION PARENTERAL at 17:02

## 2019-03-17 RX ADMIN — INSULIN HUMAN SCH U: 100 INJECTION, SOLUTION PARENTERAL at 08:27
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
## 2019-03-17 NOTE — PN
DATE:  03/17/2019



SUBJECTIVE:  She is here with a left second toe ulcer, diabetes,

hypertension was sent in by Podiatry worried about the toe, the bone.



MEDICATIONS:  She is on Amaryl, ceftaroline, Eliquis, folic acid,

Glucophage, potassium, Lasix, Neurontin, Plavix, IV fluids, Synthroid and

Tylenol.



OBJECTIVE:

GENERAL:  She is sitting out of bed to chair, little bit better than

yesterday.  May be little less pain.

VITAL SIGNS:  Temperature 97.6, 76 pulse, 114/69 blood pressure, 18

respiratory rate, 96% O2 sat on room air.

HEENT:  Head is atraumatic, normocephalic.

HEART:  Regular rate.

LUNGS:  Decreased breath sounds, but clear.

ABDOMEN:  Morbidly, morbidly obese.

EXTREMITIES:  +2/4 pitting edema, left second toe with 1.5 cm x 0.5

cm ulcer.  It looks inflamed.  It looks infected.



LABORATORY DATA:  She has an 8.6 white count, 8.5 hemoglobin, 26.9

hematocrit with 159 platelets.  INR is 1.24.  Sodium 141, potassium 3.9,

BUN 24, creatinine 1.3, GFR is 47.  Sugar is down to 150.  She did come in

with 357 that is improving.  Calcium is 8.9, total bili is 0.2, AST is

, ALT is 6, alk phos 7, total protein 6.7, TSH is 2.46 which is good.



ASSESSMENT AND PLAN:  She had a sed rate of 62, very much inflamed.  She is

being seen by Podiatry, Infectious Disease, Interventional vascular doctor.

I will see how she does continue with IV antibiotics and also to the left

second toe that is infected.







__________________________________________

Guero Reilly DO





DD:  03/17/2019 10:46:24

DT:  03/17/2019 10:48:16

Job # [id number]



MTDD

## 2019-03-17 NOTE — RAD
Date of service: 



03/16/2019



PROCEDURE:  



HISTORY:

2nd digit diabetic ulcer



COMPARISON:

None



TECHNIQUE:

Standard protocol for this study/examination.



FINDINGS:

No visible fracture.



No evidence radiographically of acute osteomyelitis.  Particular 

attention directed to the 2nd digit 



Diffuse soft tissue swelling visualize lower extremity including left 

ankle and left foot.



IMPRESSION:

Soft tissue swelling without acute articular or osseous abnormality.

## 2019-03-17 NOTE — CON
DATE OF CONSULTATION:  03/17/2019



CHIEF COMPLAINT:  Left foot infection x1 week.



HISTORY OF PRESENT ILLNESS:  This is an 83-year-old female with obesity

with a BMI of 36, who has also had diabetes, hypertension, congestive heart

failure, bronchitis, atrial fibrillation, history of urinary tract

infection, history of cerebrovascular accident, cataracts, glaucoma,

neuropathy, hypothyroidism, who is admitted with after dropping an item on

her foot, liquid, hot liquid, when she was cooking, a week later it became

into a blister.  She states it became red.  No significant pain.  No

discharge.  No fevers or chills.



REVIEW OF SYSTEMS:  Twelve-point review systems performed.



PAST MEDICAL HISTORY: significant for hypothyroidism, diabetic neuropathy,

diabetes mellitus with neuropathy, glaucoma and cataracts, cerebrovascular

accident, bronchitis, atrial fibrillation, hypertension and congestive

heart failure./



PAST SURGICAL HISTORY:  Significant for cholecystectomy, appendectomy,

repair of incarcerated hernia.



ALLERGIES:  THE PATIENT HAS ALLERGIC TO CEFUROXIME, TRAMADOL, AND

HYDROMORPHONE, AND SHE GETS RASH.  No travel.



MEDICATIONS:  The medications at home include Plavix, Lasix, Glucophage,

and Synthroid.



PHYSICAL EXAMINATION:

GENERAL:  The patient is in bed, in no acute distress.

VITAL SIGNS:  Temperature of 98, blood pressure is 130/60, respiratory rate

of 18.

HEENT:  Examination of HEENT is unremarkable.

NECK:  Supple.

LUNGS:  Have decreased breath sounds.

HEART:  Normal S1, S2.

ABDOMEN:  Soft, nontender.

EXTREMITIES:  Examination of the left foot reveals second digit has any

erythema and edema.  No discharge.  Pulses are intact.  Examination of

right foot is within normal limits.



LABORATORY DATA:  This laboratory examination reveals a white count of 8.5,

hemoglobin of 9, sed rate is 62.  Coagulation is noted.  Chemistries reveal

the creatinine is 1.3.  Random glucose is 357.  Microbiology, no growth

from the wound culture from 06/08/2018, and in the past the patient has had

urinary tract infections with Citrobacter, Klebsiella, E.  Coli.  Dr. Guero Reilly's note is reviewed.  He states the patient has outpatient

antibiotics.



ASSESSMENT AND PLAN:  This is an 83-year-old diabetic, morbid obesity,

rather just obesity with a history of morbid obesity, now obesity with a

BMI of 36, congestive heart failure, atrial fibrillation, bronchitis,

cerebrovascular accident, neuropathy, hypothyroidism with;

1.  Left foot second digit ulcer and cellulitis, must rule out underlying

osteomyelitis and must also rule out peripheral vascular disease,

peripheral arterial disease and the patient with renal insufficiency.  We

will start the patient on ceftaroline.  Arterial Dopplers were ordered. 

Consider MRI to use without any contrast and rule out osteomyelitis.  If

the patient does have an elevated sed rate, we will order a C-reactive

protein.  Vascular consultation is recommended.  We will follow with you. 

We will check on a plain x-ray.







__________________________________________

Renan Rosenbaum MD





DD:  03/17/2019 10:32:23

DT:  03/17/2019 10:36:08

Job # [id number]

## 2019-03-18 VITALS — RESPIRATION RATE: 18 BRPM | OXYGEN SATURATION: 96 %

## 2019-03-18 LAB
ALBUMIN SERPL-MCNC: 3.4 G/DL (ref 3–4.8)
ALBUMIN/GLOB SERPL: 1 {RATIO} (ref 1.1–1.8)
ALT SERPL-CCNC: < 6 U/L (ref 7–56)
AST SERPL-CCNC: 20 U/L (ref 14–36)
BUN SERPL-MCNC: 22 MG/DL (ref 7–21)
CALCIUM SERPL-MCNC: 9 MG/DL (ref 8.4–10.5)
ERYTHROCYTE [DISTWIDTH] IN BLOOD BY AUTOMATED COUNT: 15.9 % (ref 11.5–14.5)
GFR NON-AFRICAN AMERICAN: 39
HGB BLD-MCNC: 8.8 G/DL (ref 12–16)
MCH RBC QN AUTO: 27.9 PG (ref 25–35)
MCHC RBC AUTO-ENTMCNC: 31.3 G/DL (ref 31–37)
MCV RBC AUTO: 89.2 FL (ref 80–105)
PLATELET # BLD: 156 10^3/UL (ref 120–450)
PMV BLD AUTO: 9.1 FL (ref 7–11)
RBC # BLD AUTO: 3.15 10^6/UL (ref 3.5–6.1)
WBC # BLD AUTO: 7.7 10^3/UL (ref 4.5–11)

## 2019-03-18 RX ADMIN — INSULIN HUMAN SCH U: 100 INJECTION, SOLUTION PARENTERAL at 11:33

## 2019-03-18 RX ADMIN — POTASSIUM CHLORIDE SCH MEQ: 20 TABLET, EXTENDED RELEASE ORAL at 10:44

## 2019-03-18 RX ADMIN — INSULIN HUMAN SCH U: 100 INJECTION, SOLUTION PARENTERAL at 17:20

## 2019-03-18 RX ADMIN — INSULIN HUMAN SCH: 100 INJECTION, SOLUTION PARENTERAL at 21:21

## 2019-03-18 RX ADMIN — INSULIN HUMAN SCH: 100 INJECTION, SOLUTION PARENTERAL at 07:40

## 2019-03-18 NOTE — PN
DATE:  03/18/2019



SUBJECTIVE:  The patient is in bed in no acute distress.  The patient was

seen earlier today in 564, bed 1.  No fevers, and no chills.



PHYSICAL EXAMINATION:

VITAL SIGNS:  Temperature is 98, blood pressure is 108/60, respiratory rate

of 18.

HEENT:  Unremarkable.

NECK:  Supple.

LUNGS:  Have decreased breath sounds.

HEART:  Normal S1 and S2.

ABDOMEN:  Soft.



LABORATORY DATA:  Reveals a white count of 7.7, hemoglobin of 8. 

Chemistries are noted, creatinine of 1.3.  Microbiology reveals the blood

cultures are negative, left foot cultures, no growth.  Review of orders

reveals the patient to be on ceftaroline, and Dr. Aguilera's ultrasound of

extremities report is reviewed and normal JAE, examination at rest.



ASSESSMENT AND PLAN:  This is an 83-year-old female with diabetes, history

of morbid obesity, now is with obesity, BMI of 36, congestive heart

failure, atrial fibrillation, bronchitis, cerebrovascular accident,

neuropathy, hypothyroidism with left foot second digit cellulitis, must

rule out underlying osteomyelitis, may order an MRI without contrast to

rule out osteo; and arterial studies are noted, currently on Teflaro; and

 _____ consultation is reviewed.  We will follow closely with you.







__________________________________________

Renan Rosenbaum MD





DD:  03/18/2019 15:39:35

DT:  03/18/2019 15:41:23

Job # [id number]

## 2019-03-18 NOTE — PN
DATE:  03/18/2019



SUBJECTIVE:  She came in after Podiatry sent her in because of her toe, she

has a left second toe ulcer after she dropped a head of cabbage on it.  She

is also a diabetic with hypertension.



PHYSICAL EXAMINATION

VITAL SIGNS:  She has a 97.6 temperature, 81 pulse, 108/68 blood pressure,

18 respiratory rate, 96% O2 sat on room air.

HEENT:  Head is atraumatic, normocephalic.

CARDIOPULMONARY:  Heart regular rate.

LUNGS:  With decreased breath sounds but clear.

ABDOMEN:  Soft, morbidly obese, nontender.

EXTREMITIES:  The left second toe has got an ulcer that looks like it is

starting to dry at little bit, which is good.



LABORATORY DATA:  She has a 7.7 white count, 8.8 hemoglobin, 28.1

hematocrit with 156 platelets.  INR is 1.24.  A 144 sodium, potassium 4.5,

BUN 22, creatinine 1.3.  She is a little bit better.  GFR is 39, sugar is

98, calcium is 9, total bili is 0.2, AST is 20, ALT is 6, alk phos 63,

total protein 6.8.  C-reactive protein is 30.1 which is high.  The sed rate

was also high when she came in at 62.



MEDICATIONS:  She is on IV antibiotics.  She is on Amaryl, ceftaroline,

Eliquis, folic acid, Glucophage, insulin, potassium, Lasix, Neurontin,

Plavix, IV fluids, Synthroid and Tylenol.



ASSESSMENT AND PLAN:  She is being seen by Podiatry who sent her in and

Infectious Disease have her on intravenous antibiotics.  She also needs to

have physical therapy which was ordered.  Continue intravenous antibiotics,

physical therapy.  Checking her labs.  Discuss with Infectious Disease.







__________________________________________

Guero Reilly DO





DD:  03/18/2019 8:21:57

DT:  03/18/2019 8:22:39

Job # [id number]

## 2019-03-18 NOTE — US
PROCEDURE:  Lower extremity JAE exam



HISTORY:

Peripheral vascular disease with pain and ulceration.  Diabetes. 



PHYSICIAN(S):  Kory Aguilera MD.



FINDINGS:

The resting JAE's are normal: right, 1.22and left, 1.18.



The brachial systolic pressures are symmetric.



The high thigh pressures and waveforms are relatively normal.



The calf PVR waveforms augment normally. No significant gradients are 

noted across the thighs.



The ankle and metatarsal waveforms are relatively normal and 

symmetric. No significant pressure gradients are noted across the 

lower legs.



IMPRESSION:

1. Normal JAE and PVR examination at rest.
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
## 2019-03-19 VITALS — DIASTOLIC BLOOD PRESSURE: 77 MMHG | SYSTOLIC BLOOD PRESSURE: 128 MMHG

## 2019-03-19 VITALS — HEART RATE: 75 BPM | TEMPERATURE: 98 F

## 2019-03-19 LAB
ALBUMIN SERPL-MCNC: 3.5 G/DL (ref 3–4.8)
ALBUMIN/GLOB SERPL: 1.1 {RATIO} (ref 1.1–1.8)
ALT SERPL-CCNC: 9 U/L (ref 7–56)
AST SERPL-CCNC: 19 U/L (ref 14–36)
BUN SERPL-MCNC: 25 MG/DL (ref 7–21)
CALCIUM SERPL-MCNC: 9.4 MG/DL (ref 8.4–10.5)
ERYTHROCYTE [DISTWIDTH] IN BLOOD BY AUTOMATED COUNT: 15.9 % (ref 11.5–14.5)
GFR NON-AFRICAN AMERICAN: 39
HGB BLD-MCNC: 8.6 G/DL (ref 12–16)
MCH RBC QN AUTO: 27.9 PG (ref 25–35)
MCHC RBC AUTO-ENTMCNC: 31.3 G/DL (ref 31–37)
MCV RBC AUTO: 89.3 FL (ref 80–105)
PLATELET # BLD: 153 10^3/UL (ref 120–450)
PMV BLD AUTO: 9.1 FL (ref 7–11)
RBC # BLD AUTO: 3.08 10^6/UL (ref 3.5–6.1)
WBC # BLD AUTO: 6.7 10^3/UL (ref 4.5–11)

## 2019-03-19 RX ADMIN — POTASSIUM CHLORIDE SCH MEQ: 20 TABLET, EXTENDED RELEASE ORAL at 11:17

## 2019-03-19 RX ADMIN — INSULIN HUMAN SCH: 100 INJECTION, SOLUTION PARENTERAL at 07:12

## 2019-03-19 RX ADMIN — INSULIN HUMAN SCH U: 100 INJECTION, SOLUTION PARENTERAL at 13:12

## 2019-03-19 NOTE — PN
DATE:  03/19/2019



SUBJECTIVE:  The patient is in bed, in no acute distress, nontoxic.



PHYSICAL EXAMINATION:

VITAL SIGNS:  Temperature 98, blood pressure 120/60, respiratory rate 18.

HEENT:  Examination of HEENT is unremarkable.

NECK:  Supple.

LUNGS:  Decreased breath sounds.

HEART:  Normal S1, S2.

ABDOMEN:  Soft, nontender.



LABORATORY DATA:  Laboratory examination reveals the creatinine is 1.3 and

white count is 6.7.  Sed rate is 62.



Examination of the toe is much improved.  I spoke to Dr. uMnoz.  She

feels this is only superficial.  She reviewed the x-rays and she feels

there is no underlying osteomyelitis.



ASSESSMENT AND PLAN:  This is an 83-year-old female with diabetes and

history of morbid obesity and BMI of 36, congestive heart failure, atrial

fibrillation, bronchitis, cerebrovascular accident, neuropathy,

hypothyroidism with a left foot second digit cellulitis that is resolved

and Dr. Munoz feels this is not osteomyelitis and she spoke to her in

length.  She does not recommend further imaging.  We will be switching to

oral Augmentin, 875 p.o. b.i.d. x5 days.  She will follow with Dr. Munoz

as outpatient.  Case was discussed with the patient's daughter who also

lives with the patient in the same household and who was in the room at the

time of the examination today.





__________________________________________

Renan Rosenbaum MD





DD:  03/19/2019 13:22:34

DT:  03/19/2019 13:24:44

Job # [id number]

## 2019-03-19 NOTE — PN
DATE:  03/19/2019



SUBJECTIVE:  She is resting comfortably in bed.  She has a left second toe

with an ulcer which is all bandaged right now.  She is also diabetic with

hypertension.  She is morbidly obese but she is comfortable.  No pain.



MEDICATIONS:  She is on Amaryl, ceftaroline, Eliquis, folic acid,

Glucophage, potassium, Lasix, Neurontin, Plavix, IV fluids, Synthroid and

Tylenol.



PHYSICAL EXAMINATION

VITAL SIGNS:  She has 98.3 temperature, 73 pulse, 124/72 blood pressure, 18

respiratory rate and 96% O2 sat.

HEENT:  Head is atraumatic and normocephalic.

HEART:  Regular rate.

LUNGS:  Decreased breath sounds.

ABDOMEN:  Soft and obese.

EXTREMITIES:  The left foot is bandaged.



LABORATORY DATA:  She has a 6.7 white count, 8.6 hemoglobin, 27.5

hematocrit with 153 platelets.  A 141 sodium, potassium 4.7, BUN 25,

creatinine 1.3, blood sugar is 111, calcium is 9, total bili is 0.2, AST is

20, ALT is 6, alk phos 63 and total protein 6.8.



She is being seen by Interventional Radiologist and also Infectious Disease

and Podiatry.  She is on IV antibiotics.  Left foot second digit cellulitis

 underlying osteomyelitis.  The extremity ultrasound showed normal

JAE.  We will see what physical therapy adds to the picture.  When I can

get her off the IV antibiotics, I will discharge her home on oral

antibiotics.  She is here for left toe cellulitis and ulcer, .







__________________________________________

Guero Reilly DO





DD:  03/19/2019 8:34:45

DT:  03/19/2019 8:36:34

Job # [id number]



[email or web address]

## 2019-04-20 ENCOUNTER — HOSPITAL ENCOUNTER (EMERGENCY)
Dept: HOSPITAL 42 - ED | Age: 84
Discharge: HOME | End: 2019-04-20
Payer: COMMERCIAL

## 2019-04-20 VITALS
TEMPERATURE: 98.5 F | OXYGEN SATURATION: 100 % | SYSTOLIC BLOOD PRESSURE: 126 MMHG | HEART RATE: 83 BPM | DIASTOLIC BLOOD PRESSURE: 57 MMHG | RESPIRATION RATE: 18 BRPM

## 2019-04-20 VITALS — BODY MASS INDEX: 34.7 KG/M2

## 2019-04-20 DIAGNOSIS — L97.529: ICD-10-CM

## 2019-04-20 DIAGNOSIS — E11.621: Primary | ICD-10-CM

## 2019-04-20 DIAGNOSIS — E03.9: ICD-10-CM

## 2019-04-20 DIAGNOSIS — Z86.73: ICD-10-CM

## 2019-04-20 DIAGNOSIS — I10: ICD-10-CM

## 2019-04-20 DIAGNOSIS — I50.9: ICD-10-CM

## 2019-04-20 NOTE — ED PDOC
Arrival/HPI





- General


Chief Complaint: Lower Extremity Problem/Injury


Time Seen by Provider: 04/20/19 18:41


Historian: Patient





- History of Present Illness


Narrative History of Present Illness (Text): 





04/20/19 18:56


An 83 year old female, whose past medical history includes diabetes, presents to

the emergency room accompanied by family complaining of left foot 2nd toe pain 

for the past 1 week. Patient reports pain occurs upon movement and notes slight 

swelling to her left foot. Patient states she was initially prescribed to keep 

her feet elevated but earlier this week she then prescribed to walk on her left 

foot to improved circulation and upon walking on her left foot for one week, she

started to have pain. Patient also notes she is feeling a burning sensation to 

her both of her feet. Patient states Dr. Reilly saw her yesterday who stated her

left toe is infected started the patient on an antibiotics treatment. Patient 

also notes she saw her podiatrist, Dr. Jacob, earlier today who removed the 

scab on the wound and prescribed her a cream and advised patient to wait a week 

to check on any improvement. Patient notes she took Tylenol for pain to no 

relief. Patient denies any fever, chills, leg pain, or any other complaints. 


 


PMD: Dr. Reilly


Podiatrist: Dr. Jacob





Time/Duration: 1 week


Symptom Onset: Gradual


Symptom Course: Unchanged


Activities at Onset: Light


Context: Home





Past Medical History





- Provider Review


Nursing Documentation Reviewed: Yes





- Past History


Past History: Non-Contributing





- Infectious Disease


Hx of Infectious Diseases: None





- Tetanus Immunization


Tetanus Immunization: Unknown





- Reproductive


Menopause: Yes





- Past Medical History


Past Medical History: No Previous





- Cardiac


Hx Congestive Heart Failure: Yes


Hx Hypertension: Yes





- Pulmonary


Hx Respiratory Disorders: Yes


Hx Bronchitis: Yes





- Neurological


HX Cerebrovascular Accident: Yes





- HEENT


Hx HEENT Disorder: Yes (uses glasses)


Hx Cataracts: Yes (sx)


Hx Glaucoma: Yes





- Renal


Hx Renal Disorder: No





- Endocrine/Metabolic


Hx Hypothyroidism: Yes





- Hematological/Oncological


Hx Blood Disorders: No


Hx AIDS: No


Hx Anemia: No


Hx Cancer: No


Hx Chemotherapy: No


Hx Cirrhosis: No


Hx Hepatitis A: No


Hx Hepatitis B: No


Hx Hepatitis C: No


Hx Metastasis: No


Hx Shingles: No


Hx Unexplained Bleeding: No





- Integumentary


Hx Dermatological Disorder: Yes


Other/Comment: 3-16-19 LEFT 2ND TOE WITH OLD BLISTER DRYING UP MEASURES 1.5 X 

0.8 CM ,3RD TOE IS REDDENED. BOTH ARE SWOLLEN AND PAINFUL EDEMA +2. WAS COOKING 

CABBAGE WHEN IT FELL ON HER TOES.





- Musculoskeletal/Rheumatological


Hx Arthritis: Yes





- Gastrointestinal


Hx Gastrointestinal Disorders: Yes (IBS-CHRONIC WATERY BM)


Other/Comment: APPENDECTOMY,CHOLECYSTECTOMY,UMBILICAL HERNIA





- Genitourinary/Gynecological


Hx Genitourinary Disorders: Yes (frequent urination)





- Psychiatric


Hx Emotional Abuse: No


Hx Physical Abuse: No


Hx Substance Use: No





- Past Surgical History


Past Surgical History: Unable to Obtain





- Surgical History


Hx Appendectomy: Yes


Hx Cholecystectomy: Yes





- Anesthesia


Hx Anesthesia: Yes


Hx Anesthesia Reactions: No


Hx Malignant Hyperthermia: No





- Suicidal Assessment


Feels Threatened In Home Enviroment: No





Family/Social History





- Physician Review


Nursing Documentation Reviewed: Yes


Family/Social History: No Known Family HX


Smoking Status: Never Smoked


Hx Alcohol Use: No


Hx Substance Use: No


Hx Substance Use Treatment: No





Allergies/Home Meds


Allergies/Adverse Reactions: 


Allergies





hydromorphone HCl [From Dilaudid] Allergy (Severe, Verified 04/20/19 18:20)


   HALLUCINATIONS


tramadol Allergy (Severe, Verified 04/20/19 18:20)


   RASH


iodine Allergy (Verified 04/20/19 18:20)


   RASH


silver sulfadiazine [From Silvadene] Allergy (Verified 04/20/19 18:20)


   RASH








Home Medications: 


                                    Home Meds











 Medication  Instructions  Recorded  Confirmed


 


Clopidogrel [Plavix] 75 mg PO DAILY 07/05/16 03/16/19


 


Furosemide [Lasix] 40 mg PO TID 07/05/16 03/16/19


 


metFORMIN [glucOPHAGE] 1,000 mg PO BID 07/05/16 03/16/19


 


Levothyroxine [Synthroid] 25 mcg PO DAILY 02/21/17 03/16/19


 


Omega-3-Acid Ethyl Esters [OMEGA 3] 1 tab PO BID 08/25/17 03/16/19


 


Potassium Chloride [K-Dur 20 mEq 20 meq PO DAILY 12/11/17 03/16/19





ER Tab]   


 


Folic Acid 1 tab PO DAILY 01/19/18 03/16/19


 


Meclizine [Meclizine*] 1 tab PO BID PRN 01/19/18 03/16/19


 


Lipase/Protease/Amylase [Zenpep Dr 1 ecc PO AC 04/16/18 03/16/19





15,000 Unit Capsule]   


 


Glimepiride [amaRYL] 4 mg PO BID 03/01/19 03/16/19














Review of Systems





- Review of Systems


Constitutional: absent: Fevers, Other (chills)


Musculoskeletal: Other (Left foot and 2nd toe pain; no leg pain)





Physical Exam


Vital Signs Reviewed: Yes





Vital Signs











  Temp Pulse Resp BP Pulse Ox


 


 04/20/19 18:02  98.5 F  83  18  126/57 L  100











Temperature: Afebrile


Blood Pressure: Hypertensive


Pulse: Regular


Respiratory Rate: Normal


Appearance: Positive for: Well-Appearing


Mental Status: Positive for: Alert and Oriented X 3





- Systems Exam


Head: Present: Atraumatic


Pupils: Present: PERRL


Extroacular Muscles: Present: EOMI


Conjunctiva: Present: Normal


Respiratory/Chest: Present: Clear to Auscultation, Good Air Exchange.  No: 

Respiratory Distress, Accessory Muscle Use


Cardiovascular: Present: Regular Rate and Rhythm, Normal S1, S2.  No: Murmurs


Lower Extremity: Present: Swelling (slight swelling to left foot), Other (scab 

in first phalanx to 2nd toe of left foot).  No: NORMAL PULSES (+2 DP pulses)


Neurological: Present: GCS=15, CN II-XII Intact, Speech Normal


Skin: Present: Warm, Dry, Normal Color.  No: Rashes


Psychiatric: Present: Alert, Oriented x 3, Normal Insight, Normal Concentration





Medical Decision Making


ED Course and Treatment: 





04/20/19 18:56


Impression: 83 year old female presenting to the emergency room complaining of 

left foot 2nd toe pain. 





Plan:


-- Xray of left foot


-- Reassess and disposition





Prior Visits:


Notes and results from previous visits were reviewed. 





Progress Notes:





04/20/19 19:54


Foot xray reviewed by me, results are negative. 





04/20/19 20:08


POC glucose 122.  Results of xray d/w patient.  Patient instruction to 

take/apply meds as prescribed by her doctor and stay off LLE d/t current 

symptoms corresponded to her increasing her walking on LLE.  Patient stable for 

d/c home.  Patient is agreeable w/POC.





- RAD Interpretation


Radiology Orders: 











04/20/19 18:54


FOOT LEFT 3 VIEWS ROUTINE [RAD] Stat 














- Scribe Statement


The provider has reviewed the documentation as recorded by the Scribe





Neetu Jack





All medical record entries made by the César were at my direction and 

personally dictated by me. I have reviewed the chart and agree that the record 

accurately reflects my personal performance of the history, physical exam, 

medical decision making, and the department course for this patient. I have also

 personally directed, reviewed, and agree with the discharge instructions and 

disposition.








Disposition/Present on Arrival





- Present on Arrival


Any Indicators Present on Arrival: No


History of DVT/PE: No


History of Uncontrolled Diabetes: No


Urinary Catheter: No


History of Decub. Ulcer: No


History Surgical Site Infection Following: None





- Disposition


Have Diagnosis and Disposition been Completed?: Yes


Diagnosis: 


 Diabetic ulcer of left foot





Disposition: HOME/ ROUTINE


Disposition Time: 20:11


Patient Plan: Discharge


Condition: STABLE


Discharge Instructions (ExitCare):  Diabetic Foot Ulcer (DC)


Additional Instructions: 





JAZ RUGGIERO, thank you for letting us take care of you today. Your provider 

was Radha Gaffney MD and you were treated for left foot 2nd digit. The emergency

 medical care you received today was directed at your acute symptoms. Take you 

medication as prescribed by your doctors. It may take several days for your 

symptoms to resolve. Return to the Emergency Department if your symptoms worsen,

 do not improve, or if you have any other problems.





Please contact your doctor for a follow up appointment in 2-3 days.  Bring any 

paperwork you were given at discharge with you along with any medications you 

are taking to your follow up visit. Our treatment cannot replace ongoing medical

 care by a primary care provider outside of the emergency department.





Thank you for allowing the [facility] team to be part of your care today.








If you had an X-Ray or CT scan: A Radiologist will review the ED reading if any 

change in treatment is needed we will contact you.***





If you had a blood, urine, or wound culture: It will take several days for the 

results, if any change in treatment is needed we will contact you.***





If you had an STI test: It will take 48 hours for the results. Please call after

 1 week if you have not heard back.***


Referrals: 


Edil Jacob DPM [Staff Provider] - Follow up with primary


Guero Reilly DO [Primary Care Provider] - Follow up with primary


Forms:  RABBL (English)

## 2019-04-21 NOTE — RAD
Date of service: 



04/20/2019



PROCEDURE:  Left Foot Radiographs.



HISTORY:

 pain/swelling 



COMPARISON:

None.



TECHNIQUE:

3 views obtained.



FINDINGS:



BONES:

Normal. No fracture. 



JOINTS:

Normal. 



SOFT TISSUES:

Normal. 



OTHER FINDINGS:

None.



IMPRESSION:

Normal left foot radiographs.

## 2019-04-26 ENCOUNTER — HOSPITAL ENCOUNTER (INPATIENT)
Dept: HOSPITAL 42 - ED | Age: 84
LOS: 4 days | Discharge: NURSING, REHAB OR LONG-TERM CARE | DRG: 66 | End: 2019-04-30
Attending: FAMILY MEDICINE | Admitting: FAMILY MEDICINE
Payer: MEDICARE

## 2019-04-26 VITALS — BODY MASS INDEX: 36.4 KG/M2

## 2019-04-26 DIAGNOSIS — E11.621: ICD-10-CM

## 2019-04-26 DIAGNOSIS — E11.42: ICD-10-CM

## 2019-04-26 DIAGNOSIS — E66.01: ICD-10-CM

## 2019-04-26 DIAGNOSIS — Z79.84: ICD-10-CM

## 2019-04-26 DIAGNOSIS — H53.40: ICD-10-CM

## 2019-04-26 DIAGNOSIS — I48.91: ICD-10-CM

## 2019-04-26 DIAGNOSIS — G93.89: ICD-10-CM

## 2019-04-26 DIAGNOSIS — H40.9: ICD-10-CM

## 2019-04-26 DIAGNOSIS — I11.0: ICD-10-CM

## 2019-04-26 DIAGNOSIS — K58.9: ICD-10-CM

## 2019-04-26 DIAGNOSIS — L97.529: ICD-10-CM

## 2019-04-26 DIAGNOSIS — E03.9: ICD-10-CM

## 2019-04-26 DIAGNOSIS — R29.701: ICD-10-CM

## 2019-04-26 DIAGNOSIS — Z79.02: ICD-10-CM

## 2019-04-26 DIAGNOSIS — I63.49: Primary | ICD-10-CM

## 2019-04-26 DIAGNOSIS — I50.9: ICD-10-CM

## 2019-04-26 DIAGNOSIS — H53.8: ICD-10-CM

## 2019-04-26 LAB
ALBUMIN SERPL-MCNC: 4.3 {NULL, G/DL} (ref 3–4.8)
ALBUMIN/GLOB SERPL: 1.2 {NULL, NULL} (ref 1.1–1.8)
ALT SERPL-CCNC: 9 {NULL, U/L} (ref 7–56)
APTT BLD: 44.3 {NULL, SECONDS} (ref 26.9–38.3)
AST SERPL-CCNC: 22 {NULL, U/L} (ref 14–36)
BASOPHILS # BLD AUTO: 0.03 {NULL, K/MM3} (ref 0–2)
BASOPHILS NFR BLD: 0.4 {NULL, %} (ref 0–3)
BUN SERPL-MCNC: 31 {NULL, MG/DL} (ref 7–21)
CALCIUM SERPL-MCNC: 9.6 {NULL, MG/DL} (ref 8.4–10.5)
EOSINOPHIL # BLD: 0.2 {NULL, NULL} (ref 0–0.7)
EOSINOPHIL NFR BLD: 2 {NULL, %} (ref 1.5–5)
ERYTHROCYTE [DISTWIDTH] IN BLOOD BY AUTOMATED COUNT: 15.6 {NULL, %} (ref 11.5–14.5)
GFR NON-AFRICAN AMERICAN: 39 {NULL, NULL}
HDLC SERPL-MCNC: 30 {NULL, MG/DL} (ref 29–60)
HGB BLD-MCNC: 10 {NULL, G/DL} (ref 12–16)
INR PPP: 1.35 {NULL, NULL}
LDLC SERPL-MCNC: 30 {NULL, MG/DL} (ref 0–129)
LYMPHOCYTES # BLD: 1.5 {NULL, NULL} (ref 1.2–3.4)
LYMPHOCYTES NFR BLD AUTO: 18.1 {NULL, %} (ref 22–35)
MCH RBC QN AUTO: 27.6 {NULL, PG} (ref 25–35)
MCHC RBC AUTO-ENTMCNC: 31.4 {NULL, G/DL} (ref 31–37)
MCV RBC AUTO: 87.8 {NULL, FL} (ref 80–105)
MONOCYTES # BLD AUTO: 0.4 {NULL, NULL} (ref 0.1–0.6)
MONOCYTES NFR BLD: 5.3 {NULL, %} (ref 1–6)
PLATELET # BLD: 199 {NULL, 10^3/UL} (ref 120–450)
PMV BLD AUTO: 9.5 {NULL, FL} (ref 7–11)
PROTHROMBIN TIME: 15 {NULL, SECONDS} (ref 9.4–12.5)
RBC # BLD AUTO: 3.62 {NULL, 10^6/UL} (ref 3.5–6.1)
TROPONIN I SERPL-MCNC: < 0.01 {NULL, NG/ML}
WBC # BLD AUTO: 8.4 {NULL, 10^3/UL} (ref 4.5–11)

## 2019-04-26 NOTE — ED PDOC
Arrival/HPI





- General


Time Seen by Provider: 04/26/19 20:43


Historian: Patient, Family, EMS





- History of Present Illness


Narrative History of Present Illness (Text): 


04/26/19 20:47


Stella Ibanez is an 83 year old female, whose past medical history includes 

CHF, hypertension, CVA, diabetes, atrial fibrillation, neuropathy, 

hypothyoridism, and glaucoma, who presents to the ED brought in by EMS 

accompanied by daughter complaining of vision changes. Patient states she has 

been experiencing right-sided peripheral blurry vision since this morning and 

daughter notes patient has been confused at times throughout the day. Daughter 

states they notified the patient's PMD, who advised them to come to the ED for 

further evaluation. Patient denies any headache, chest pain, shortness of 

breath, nausea, vomiting, neck pain, or any other complaints.





PMD: Dr. Reilly


Neurologist: Dr. Pablo





Symptom Onset: Gradual


Symptom Course: Unchanged


Activities at Onset: Light


Context: Home





Past Medical History





- Provider Review


Nursing Documentation Reviewed: Yes





- Past History


Past History: Non-Contributing





- Infectious Disease


Hx of Infectious Diseases: None





- Tetanus Immunization


Tetanus Immunization: Unknown





- Past Medical History


Past Medical History: No Previous





- Cardiac


Hx Congestive Heart Failure: Yes


Hx Hypertension: Yes





- Pulmonary


Hx Respiratory Disorders: Yes


Hx Bronchitis: Yes





- Neurological


HX Cerebrovascular Accident: Yes





- HEENT


Hx HEENT Disorder: Yes (uses glasses)


Hx Cataracts: Yes (sx)


Hx Glaucoma: Yes





- Renal


Hx Renal Disorder: No





- Endocrine/Metabolic


Hx Hypothyroidism: Yes





- Hematological/Oncological


Hx Blood Disorders: No


Hx AIDS: No


Hx Anemia: No


Hx Cancer: No


Hx Chemotherapy: No


Hx Cirrhosis: No


Hx Hepatitis A: No


Hx Hepatitis B: No


Hx Hepatitis C: No


Hx Metastasis: No


Hx Shingles: No


Hx Unexplained Bleeding: No





- Integumentary


Hx Dermatological Disorder: Yes


Other/Comment: 3-16-19 LEFT 2ND TOE WITH OLD BLISTER DRYING UP MEASURES 1.5 X 

0.8 CM ,3RD TOE IS REDDENED. BOTH ARE SWOLLEN AND PAINFUL EDEMA +2. WAS COOKING 

CABBAGE WHEN IT FELL ON HER TOES.





- Musculoskeletal/Rheumatological


Hx Arthritis: Yes





- Gastrointestinal


Hx Gastrointestinal Disorders: Yes (IBS-CHRONIC WATERY BM)


Other/Comment: APPENDECTOMY,CHOLECYSTECTOMY,UMBILICAL HERNIA





- Genitourinary/Gynecological


Hx Genitourinary Disorders: Yes (frequent urination)





- Psychiatric


Hx Emotional Abuse: No


Hx Physical Abuse: No


Hx Substance Use: No





- Past Surgical History


Past Surgical History: Unable to Obtain





- Surgical History


Hx Appendectomy: Yes


Hx Cholecystectomy: Yes





- Anesthesia


Hx Anesthesia: Yes


Hx Anesthesia Reactions: No


Hx Malignant Hyperthermia: No





- Suicidal Assessment


Feels Threatened In Home Enviroment: No





Family/Social History





- Physician Review


Nursing Documentation Reviewed: Yes


Family/Social History: Unknown Family HX


Smoking Status: Never Smoked


Hx Alcohol Use: No


Hx Substance Use: No


Hx Substance Use Treatment: No





Allergies/Home Meds


Allergies/Adverse Reactions: 


Allergies





hydromorphone HCl [From Dilaudid] Allergy (Severe, Verified 04/26/19 20:55)


   HALLUCINATIONS


tramadol Allergy (Severe, Verified 04/26/19 20:55)


   RASH


iodine Allergy (Verified 04/26/19 20:55)


   RASH


silver sulfadiazine [From Silvadene] Allergy (Verified 04/26/19 20:55)


   RASH








Home Medications: 


                                    Home Meds











 Medication  Instructions  Recorded  Confirmed


 


Clopidogrel [Plavix] 75 mg PO DAILY 07/05/16 04/26/19


 


Furosemide [Lasix] 40 mg PO TID 07/05/16 04/26/19


 


metFORMIN [glucOPHAGE] 1,000 mg PO BID 07/05/16 04/26/19


 


Levothyroxine [Synthroid] 25 mcg PO DAILY 02/21/17 04/26/19


 


Omega-3-Acid Ethyl Esters [OMEGA 3] 1 tab PO BID 08/25/17 04/26/19


 


Potassium Chloride [K-Dur 20 mEq 20 meq PO DAILY 12/11/17 04/26/19





ER Tab]   


 


Folic Acid 1 tab PO DAILY 01/19/18 04/26/19


 


Meclizine [Meclizine*] 1 tab PO BID PRN 01/19/18 04/26/19


 


Lipase/Protease/Amylase [Zenpep Dr 1 ecc PO AC 04/16/18 04/26/19





15,000 Unit Capsule]   


 


Glimepiride [amaRYL] 4 mg PO BID 03/01/19 04/26/19














Review of Systems





- Physician Review


All systems were reviewed & negative as marked: Yes





- Review of Systems


Constitutional: Normal.  absent: Fevers


Eyes: Vision Changes (+blurry right peripheral vision)


ENT: Normal


Respiratory: Normal.  absent: SOB, Cough


Cardiovascular: Normal.  absent: Chest Pain


Gastrointestinal: Normal.  absent: Abdominal Pain, Diarrhea, Nausea, Vomiting


Genitourinary Female: Normal.  absent: Dysuria, Frequency, Hematuria, Urine 

Output Changes


Musculoskeletal: Normal.  absent: Back Pain, Neck Pain


Skin: Normal.  absent: Rash


Neurological: Other (+confusion)


Endocrine: Normal


Hemo/Lymphatic: Normal


Psychiatric: Normal





Physical Exam


Vital Signs Reviewed: Yes


Temperature: Afebrile


Blood Pressure: Normal


Pulse: Regular


Respiratory Rate: Normal


Appearance: Positive for: Well-Appearing, Non-Toxic, Comfortable


Pain Distress: None


Mental Status: Positive for: Alert and Oriented X 3





- Systems Exam


Head: Present: Atraumatic, Normocephalic


Pupils: Present: PERRL, Other (Visual defect in right lateral eye field/fundi-no

 hemorrhages noted)


Extroacular Muscles: Present: EOMI


Conjunctiva: Present: Normal


Mouth: Present: Moist Mucous Membranes


Neck: Present: Normal Range of Motion


Respiratory/Chest: Present: Clear to Auscultation, Good Air Exchange.  No: 

Respiratory Distress, Accessory Muscle Use


Cardiovascular: Present: Regular Rate and Rhythm, Normal S1, S2.  No: Murmurs


Abdomen: No: Tenderness, Distention, Peritoneal Signs


Back: Present: Normal Inspection


Upper Extremity: Present: Normal Inspection.  No: Cyanosis, Edema


Lower Extremity: Present: Normal Inspection.  No: Edema


Neurological: Present: GCS=15, CN II-XII Intact, Speech Normal, Motor Func 

Grossly Intact, Normal Cerebellar Funct


Skin: Present: Warm, Dry, Normal Color.  No: Rashes


Psychiatric: Present: Alert, Oriented x 3, Normal Insight, Normal Concentration





Medical Decision Making


ED Course and Treatment: 


04/26/19 20:45


Impression:


83 year old female complaining of blurry vision in the right periphery and 

confusion.





Plan:


-- CT Head w/o contrast


-- EKG


-- Chest X-ray


-- Labs, blood type and screen, lipid panel, troponin


-- IV fluids


-- Reassess and disposition





Prior Visits:


Notes and results from previous visits were reviewed. 





Progress Notes:


04/26/19 20:45


Pt seen on arrival to Emergency department. Code Stroke called. Pt taken to CT 

scan.





04/26/19 21:13


Reviewed EKG, NSR at 73 bpm. 1st degree AV block. No acute changes.





04/26/19 22:02


CT Head:


BRAIN: 


No acute intraparenchymal hemorrhage. No mass lesion. No CT evidence for acute 

territorial infarct. No midline shift or extra-axial collections. A zone of 

decreased attenuation is seen in the posterior right parietal-occipital region 

compatible with post ischemic infarction and encephalomalacia.  Similarly, a 

smaller old postischemic infarction zone of encephalomalacia is noted in the 

left occipital lobe.  An additional small zone of postischemic infarction 

encephalomalacia is seen in the left frontal parietal region. There is moderate 

age-appropriate diffuse cerebral/cerebellar atrophy.  There are bilateral 

confluent periventricular and subcortical white matter hypolucencies compatible 

with severe chronic microvascular disease. 


VENTRICLES: 


No hydrocephalus. 


VASCULAR: 


Atherosclerotic vascular plaquing is noted within the carotid siphons 

bilaterally. 


ORBITS: 


The orbits are unremarkable. 


SINUSES AND MASTOIDS: 


Opacification is seen in the posterior left ethmoid sinuses thought compatible 

with sinusitis. A 7.7 mm mucous retention cyst or polyp is seen in the medial 

left frontal sinus. The remaining paranasal sinuses and mastoid air cells are 

clear. 


BONES: 


No fracture. 


SOFT TISSUES: 


Unremarkable. 


IMPRESSION: 


1.  No acute intracranial abnormality. 


2.  Multiple prior ischemic infarctions as described above; the largest located 

in the posterior right parietal-occipital region. 


3.  Moderate diffuse age-appropriate cerebral and cerebellar atrophy. 


4.  Severe chronic microvascular disease. 


5.  Atherosclerotic vascular plaquing as described above. 


Communicated to Dr Hay 9-28pm EST.


Electronically signed on Apr 26, 2019 9:33:03 PM EDT by:


Moises Kumar M.D., M.B.A., Certified By [facility]


Fellowship Trained MRI and CT Specialist





04/26/19 22:06


Chest X-ray reviewed, shows no acute processes.





Call placed to Dr. Conti, neurologist on call. Awaiting call back.





04/26/19 23:01


Case discussed with Dr. Reilly, who is aware and agrees with plan. Accepts pt in

 to his service. Requests Dr. Pablo on consult.





04/26/19 23:05


Case discussed with Dr. Conti, neurologist on call, who is aware and agrees with 

plan. States pt is not a candidate for tPA. Recommends pt receive Aspirin.





- Critical Care


Critical Care Minutes: 30 minutes





- Lab Interpretations


I have reviewed the lab results: Yes





- RAD Interpretation


: ED Physician, Radiologist





- EKG Interpretation


Interpreted by ED Physician: Yes


Type: 12 lead EKG





NIHSS Scale ([place])


Time Performed: 20:45





- How Severe is the Stoke


  ** Baseline


Level of Consciousness: 0=Alert


LOC to Questions: 0=Both comments correct


LOC to commands: 0=Obeys both correctly


Best Gaze: 0=Normal


Visual: 1=Partial hemianopia


Facial: 0=Normal


Motor Arm - Left: 0=No drift


Motor Arm - Right: 0=No drift


Motor Leg - Left: 0=No drift


Motor Leg - Right: 0=No drift


Limb Ataxia: 0=Absent


Sensory: 0=Normal


Best Language: 0=No aphasia


Dysarthia: 0=Normal articulation


Extinction & Inattention (Neglect): 0=Normal, no object


Score: 1


Risk Level: Minor Stroke Risk





rTPA Inclusion/Exclusion





- Refusal of Treatment


Patient Refused Treatment: No





- Inclusion Criteria for Altepase


All of the below criteria for inclusion were reviewed: Yes


Patient is 18 years or Older: Yes


The Clinical Diagnosis of Ischemic Stroke That is Causing a Potentially 

Disabling Neurological Deficit: Yes


Time of Onset is Well Established to be Less Than 270 Minute Before Treatment 

Would Begin: Yes


Risk/Benefit Discussed With Patient/Family Member Present: Yes





- Exclusion Criteria for Altepase


Current Intracranial Hemorrhage: No


Subarachnoid hemorrhage: No


Active Internal Bleeding: No


Recent (within 3 months) Intracranial or Intraspinal Surgery: No


Current Severe Uncontrolled Hypertension: No





- Warning to TPA With Conditions


Following Conditions Weighed Against Anticipated Benefit: Yes


Condition: Patients currently receiving anticoagulants





- Scribe Statement


The provider has reviewed the documentation as recorded by the César Ortiz





Provider Scribe Attestation:


All medical record entries made by the César were at my direction and 

personally dictated by me. I have reviewed the chart and agree that the record 

accurately reflects my personal performance of the history, physical exam, 

medical decision making, and the department course for this patient. I have also

 personally directed, reviewed, and agree with the discharge instructions and 

disposition.








Disposition/Present on Arrival





- Present on Arrival


Any Indicators Present on Arrival: No


History of DVT/PE: No


History of Uncontrolled Diabetes: No


Urinary Catheter: No


History of Decub. Ulcer: No


History Surgical Site Infection Following: None





- Disposition


Have Diagnosis and Disposition been Completed?: Yes


Diagnosis: 


 Partial hemianopia, TIA (transient ischemic attack)





Disposition: HOSPITALIZED


Disposition Time: 23:15


Patient Problems: 


                             Current Active Problems











Problem Status Onset


 


Partial hemianopia Acute 


 


TIA (transient ischemic attack) Acute 











Condition: STABLE

## 2019-04-27 LAB
ALBUMIN SERPL-MCNC: 3.8 {NULL, G/DL} (ref 3–4.8)
ALBUMIN/GLOB SERPL: 1.2 {NULL, NULL} (ref 1.1–1.8)
ALT SERPL-CCNC: 18 {NULL, U/L} (ref 7–56)
AST SERPL-CCNC: 16 {NULL, U/L} (ref 14–36)
BASOPHILS # BLD AUTO: 0.02 {NULL, K/MM3} (ref 0–2)
BASOPHILS NFR BLD: 0.3 {NULL, %} (ref 0–3)
BUN SERPL-MCNC: 30 {NULL, MG/DL} (ref 7–21)
CALCIUM SERPL-MCNC: 9 {NULL, MG/DL} (ref 8.4–10.5)
EOSINOPHIL # BLD: 0.2 {NULL, NULL} (ref 0–0.7)
EOSINOPHIL NFR BLD: 2.8 {NULL, %} (ref 1.5–5)
ERYTHROCYTE [DISTWIDTH] IN BLOOD BY AUTOMATED COUNT: 15.7 {NULL, %} (ref 11.5–14.5)
GFR NON-AFRICAN AMERICAN: 43 {NULL, NULL}
HGB BLD-MCNC: 9.5 {NULL, G/DL} (ref 12–16)
LYMPHOCYTES # BLD: 1.7 {NULL, NULL} (ref 1.2–3.4)
LYMPHOCYTES NFR BLD AUTO: 21.3 {NULL, %} (ref 22–35)
MCH RBC QN AUTO: 28.3 {NULL, PG} (ref 25–35)
MCHC RBC AUTO-ENTMCNC: 31.5 {NULL, G/DL} (ref 31–37)
MCV RBC AUTO: 89.9 {NULL, FL} (ref 80–105)
MONOCYTES # BLD AUTO: 0.7 {NULL, NULL} (ref 0.1–0.6)
MONOCYTES NFR BLD: 8.6 {NULL, %} (ref 1–6)
PLATELET # BLD: 150 {NULL, 10^3/UL} (ref 120–450)
PMV BLD AUTO: 10.7 {NULL, FL} (ref 7–11)
RBC # BLD AUTO: 3.36 {NULL, 10^6/UL} (ref 3.5–6.1)
WBC # BLD AUTO: 7.8 {NULL, 10^3/UL} (ref 4.5–11)

## 2019-04-27 RX ADMIN — POTASSIUM CHLORIDE SCH MEQ: 20 TABLET, EXTENDED RELEASE ORAL at 08:22

## 2019-04-27 RX ADMIN — INSULIN HUMAN SCH UNITS: 100 INJECTION, SOLUTION PARENTERAL at 17:28

## 2019-04-27 RX ADMIN — INSULIN HUMAN SCH UNITS: 100 INJECTION, SOLUTION PARENTERAL at 12:12

## 2019-04-27 RX ADMIN — OMEGA-3-ACID ETHYL ESTERS SCH GM: 900 CAPSULE, LIQUID FILLED ORAL at 09:53

## 2019-04-27 RX ADMIN — MUPIROCIN SCH APPLIC: 20 OINTMENT TOPICAL at 17:27

## 2019-04-27 RX ADMIN — AMOXICILLIN AND CLAVULANATE POTASSIUM SCH TAB: 500; 125 TABLET, FILM COATED ORAL at 17:25

## 2019-04-27 RX ADMIN — AMOXICILLIN AND CLAVULANATE POTASSIUM SCH TAB: 500; 125 TABLET, FILM COATED ORAL at 09:52

## 2019-04-27 RX ADMIN — OMEGA-3-ACID ETHYL ESTERS SCH GM: 900 CAPSULE, LIQUID FILLED ORAL at 17:31

## 2019-04-27 NOTE — CT
Date of service: 



04/26/2019



PROCEDURE:  CT HEAD WITHOUT CONTRAST.



HISTORY:

Code Stroke



COMPARISON:

CT head dated 04/16/2018.



TECHNIQUE:

Axial computed tomography images were obtained through the head/brain 

without intravenous contrast.  



Radiation dose:



Total exam DLP = 1012.87 mGy-cm.



This CT exam was performed using one or more of the following dose 

reduction techniques: Automated exposure control, adjustment of the 

mA and/or kV according to patient size, and/or use of iterative 

reconstruction technique.



FINDINGS:



HEMORRHAGE:

No intracranial hemorrhage. 



BRAIN:

No mass effect or edema.  Atrophy.  Chronic microvascular ischemic 

changes.  Stable areas of encephalomalacia in the high left frontal, 

left occipital, right parieto-occipital and right cerebellar regions.



VENTRICLES:

Prominent.  No hydrocephalus. 



CALVARIUM:

Unremarkable.



PARANASAL SINUSES:

Scattered ethmoid air cell opacification.  Small polyp or retention 

cyst in the left frontal sinus.



MASTOID AIR CELLS:

Unremarkable as visualized. No inflammatory changes.



OTHER FINDINGS:

None.



IMPRESSION:

No acute intracranial pathology.  Age-related changes.  Old bilateral 

infarcts as above described.  No significant interval change.



Mild bilateral ethmoid sinus disease.

## 2019-04-27 NOTE — CARD
--------------- APPROVED REPORT --------------





Date of service: 04/26/2019



EKG Measurement

Heart [id number]

MD [id number]

[id number]

[id number]

[id number]



<Conclusion>

Sinus rhythm with 1st degree AV block

Otherwise normal ECG

## 2019-04-27 NOTE — HP
HISTORY OF PRESENT ILLNESS:  I got a call from her daughter last night that

she was acting strange and talking funny of seeing things.  She has done

this before.  She has had TIAs in the past.  She is an 83-year-old white

female who I know from house calls who presents with changes in vision also

confusion as per the daughter, the daughter is very good, they see each

other every day.  She is having vision changes in the right eye.  She has

questionable right-sided facial droop also at one moment.  She is an

83-year-old female with the past medical history of CHF, hypertension, CVA,

diabetes, atrial fibrillation, neuropathy, hypothyroidism, glaucoma,

morbidly obese, right toe ulcer, comes in with vision changes and

confusion.  As this is kind of a sudden change yesterday evening.  She had

seen Dr. Pablo in the past for this.  He has hypertension, CHF,

bronchitis, sinusitis, CVA.  She wears glasses, cataracts, glaucoma,

hypothyroidism.  She has a ulcer of the left second toe.  She has irritable

bowel syndrome, appendectomy, cholecystectomy, umbilical hernia 

surgeries.  She has frequent urination.



FAMILY HISTORY:  Hypertension in the family.



SOCIAL HISTORY:  Never smoked.  No drinking.  No drugs.



ALLERGIES:  TO DILAUDID, TRAMADOL, IODINE AND SILVADENE CREAM.



MEDICATIONS:  She is on Plavix, Lasix, Glucophage, Synthroid, omega-3,

potassium, folic acid, meclizine, Amaryl, Pancrease, Eliquis.



She has a right peripheral vision on the right eye.  She did have confusion

and right facial droop, which seems to be better now that I am seeing her. 

No shortness of breath.  No cough.  No chest pain or palpitations.  No

abdominal pain, nausea, vomiting, constipation, diarrhea at this time.  She

does get diarrhea from time to time.  She does have increase in urination

at her baseline, but no issues now.  Does not feel like urinary tract

infection.  No back pain.  No neck pain.  No rashes.  There is a right

second toe ulcer anteriorly.  She is confused when she came in.  She is

better now with the confusion, the left to the right eye blurriness is

still persistent, little anxious.



PHYSICAL EXAMINATION:

VITAL SIGNS:  She has a 97.5 temperature, 74 pulse, 120/74 blood pressure,

18 respiratory rate, 97% O2 sat on room air.

GENERAL:  She is well-appearing, nontoxic, comfortable at this time.  Alert

and oriented x3 maybe a little nervous to the change in her vision, sudden

change.

HEENT:  Head is atraumatic, normocephalic.  No falls.  Extra muscles are

intact.  Visual defect in the right lateral eye field, no hemorrhages. 

Extraocular muscles are intact.  Throat is moist.

NECK:  Supple.

HEART:  Regular rate.  Normal S1, S2.

LUNGS:  Decreased breath sounds, but clear to auscultation with poor

inspiration.

ABDOMEN:  Soft, nontender, positive bowel sounds.  No guarding, no rebound,

no CVA tenderness.  She is also morbidly obese.

EXTREMITIES:  No edema in the lower extremities.  GCS is 15.  Cranial

nerves II-XII grossly intact.  Speech is normal.  Tongue is midline.  She

can close her eyes tight.  She can raise her arms overhead.  Just a

peripheral lateral vision on the right eye is off.

SKIN:  Warm and dry.  She has ulcer in the right second toe anteriorly. 

Alert and oriented x3.  Not confused at this time.



LABORATORY DATA:  She had multiple tests done.  The chest x-ray showed no

acute disease.  EKG showed normal sinus rhythm 73 beats per minute.  First

degree AV block.  CAT scan of the head showed no acute intracranial

abnormality, prior ischemic infarctions in the past.  Appropriate age,

cerebellar and cerebral atrophy.  She has a 142 sodium, potassium was 3.5,

then it went to 3.3, I gave her potassium replacement, BUN is better with

31.2, improving.  GFR is up to 52.  Sugar is 94, came in it was 218;

calcium is 9; phosphorus 4, magnesium 1.3, total bili is 0.4.  AST is 16,

ALT is 18, alk phos 65.  Troponin I is less than 0.01.  Total protein 7,

albumin is 3.8.  White count is 8.4, hemoglobin 10, hematocrit 31.8,

platelets of 199.  INR is 1.35.



She has multiple consults Neurology, eye doctor, podiatrist.  She is on

Amaryl, Antivert, aspirin Augmentin for the sinusitis, Eliquis; folic acid;

Glucophage; insulin coverage; potassium replacement; Lasix; Neurontin;

Pancrease; Plavix; IV fluids at 100, I will decrease the rate and

levothyroxine.  We will check her labs tomorrow, Physical Therapy, eye

doctor, neurologist.  Hopefully, this will be a TIA and resolve.  We will

keep a close eye on her.







__________________________________________

Guero Reilly DO





DD:  04/27/2019 10:09:27

DT:  04/27/2019 10:20:17

Job # [id number]



[email or web address]
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
## 2019-04-27 NOTE — RAD
Date of service: 



04/26/2019



HISTORY:

 Code Stroke 



COMPARISON:

Chest radiograph dated 10/08/2018. 



TECHNIQUE:

1 view obtained.



FINDINGS:



LUNGS:

No active pulmonary disease.



PLEURA:

No significant pleural effusion identified, no pneumothorax apparent.



CARDIOVASCULAR:

Aortic atherosclerotic calcifications.  Cardiomediastinal silhouette 

stably enlarged. 



OSSEOUS STRUCTURES:

Unchanged.



VISUALIZED UPPER ABDOMEN:

Normal.



OTHER FINDINGS:

None.



IMPRESSION:

No active disease.

## 2019-04-28 LAB
ALBUMIN SERPL-MCNC: 3.7 {NULL, G/DL} (ref 3–4.8)
ALBUMIN/GLOB SERPL: 1.2 {NULL, NULL} (ref 1.1–1.8)
ALT SERPL-CCNC: 14 {NULL, U/L} (ref 7–56)
AST SERPL-CCNC: 18 {NULL, U/L} (ref 14–36)
BUN SERPL-MCNC: 27 {NULL, MG/DL} (ref 7–21)
CALCIUM SERPL-MCNC: 9 {NULL, MG/DL} (ref 8.4–10.5)
ERYTHROCYTE [DISTWIDTH] IN BLOOD BY AUTOMATED COUNT: 15.7 {NULL, %} (ref 11.5–14.5)
GFR NON-AFRICAN AMERICAN: 48 {NULL, NULL}
HGB BLD-MCNC: 8.8 {NULL, G/DL} (ref 12–16)
MCH RBC QN AUTO: 27.9 {NULL, PG} (ref 25–35)
MCHC RBC AUTO-ENTMCNC: 31.8 {NULL, G/DL} (ref 31–37)
MCV RBC AUTO: 87.9 {NULL, FL} (ref 80–105)
PLATELET # BLD: 148 {NULL, 10^3/UL} (ref 120–450)
PMV BLD AUTO: 9.9 {NULL, FL} (ref 7–11)
RBC # BLD AUTO: 3.15 {NULL, 10^6/UL} (ref 3.5–6.1)
WBC # BLD AUTO: 8 {NULL, 10^3/UL} (ref 4.5–11)

## 2019-04-28 RX ADMIN — OMEGA-3-ACID ETHYL ESTERS SCH GM: 900 CAPSULE, LIQUID FILLED ORAL at 17:20

## 2019-04-28 RX ADMIN — INSULIN HUMAN SCH: 100 INJECTION, SOLUTION PARENTERAL at 22:14

## 2019-04-28 RX ADMIN — INSULIN HUMAN SCH: 100 INJECTION, SOLUTION PARENTERAL at 10:30

## 2019-04-28 RX ADMIN — MUPIROCIN SCH APPLIC: 20 OINTMENT TOPICAL at 17:22

## 2019-04-28 RX ADMIN — MUPIROCIN SCH APPLIC: 20 OINTMENT TOPICAL at 10:28

## 2019-04-28 RX ADMIN — OMEGA-3-ACID ETHYL ESTERS SCH GM: 900 CAPSULE, LIQUID FILLED ORAL at 10:28

## 2019-04-28 RX ADMIN — INSULIN HUMAN SCH: 100 INJECTION, SOLUTION PARENTERAL at 17:22

## 2019-04-28 RX ADMIN — AMOXICILLIN AND CLAVULANATE POTASSIUM SCH TAB: 500; 125 TABLET, FILM COATED ORAL at 17:21

## 2019-04-28 RX ADMIN — AMOXICILLIN AND CLAVULANATE POTASSIUM SCH: 500; 125 TABLET, FILM COATED ORAL at 14:57

## 2019-04-28 RX ADMIN — POTASSIUM CHLORIDE SCH MEQ: 20 TABLET, EXTENDED RELEASE ORAL at 10:29

## 2019-04-28 RX ADMIN — INSULIN HUMAN SCH UNITS: 100 INJECTION, SOLUTION PARENTERAL at 11:59

## 2019-04-28 NOTE — MRI
Date of service: 



04/27/2019



PROCEDURE:  MRI BRAIN WITHOUT CONTRAST



HISTORY:

Right visual field cut



COMPARISON:

Comparison made with prior CT scan of the brain dated the 04/26/2019. 



TECHNIQUE:

Limited axial T2, FLAIR and diffusion-weighted sequences were 

obtained due to technical difficulties during examination (machine 

shutdown) per technologist notation. 



FINDINGS:



HEMORRHAGE:

No definitive radiographic evidence of acute parenchymal, 

subarachnoid or extra-axial hemorrhage.



DWI:

There is a focal area restricted diffusion left occipital pole 

consistent with an acute infarct. 



BRAIN PARENCHYMA:

Redemonstrated are chronic appearing infarcts right posterior 

temporoparietal infarct extending superiorly into the right 

parieto-occipital watershed zone.  The chronic right cerebellar 

infarct is also present.  Mild diffuse/confluent chronic 

periventricular white matter ischemic changes seen extending 

peripherally into the deep and to a lesser degree subcortical white 

matter both cerebral hemispheres.  There is also a discrete small 

chronic appearing infarct left superior frontal lobe at the vertex..  

Minor chronic brainstem ischemic changes are also seen. 



Moderate to fairly significant generalized volume loss 



VENTRICLES:

No obstructive hydrocephalus.



CRANIUM:

Unremarkable.



ORBITS:

Changes of right-sided cataract surgery again noted.



PARANASAL SINUSES/MASTOIDS:

Mild mucosal thickening within multiple right-sided ethmoid air cells 

extending superiorly into the inferior margin of the left aspect of 

the hypoplastic appearing frontal sinuses. 



VASCULAR SYSTEM:

Visualized major vascular flow voids at skull base patent. 



OTHER FINDINGS:

None. 



IMPRESSION:

Limited study as above. 



There is a acute infarct left occipital pole. 



Redemonstrated are chronic appearing infarcts right posterior 

temporoparietal infarct extending superiorly into the right 

parieto-occipital watershed zone.  The chronic right cerebellar 

infarct is also present.  Mild diffuse/confluent chronic 

periventricular white matter ischemic changes seen extending 

peripherally into the deep and to a lesser degree subcortical white 

matter both cerebral hemispheres.  There is also a discrete small 

chronic appearing infarct left superior frontal lobe at the vertex..  

Minor chronic brainstem ischemic changes are also seen. 



Moderate to fairly significant generalized volume loss 



Note that these findings were discussed with 4 Lincoln Santiago at 

approximately 11:49 a.m. 04/28/2019.   Findings also discussed with 

Dr. Pablo at approximately 11:54 p.m. with written down and read 

back verification

## 2019-04-28 NOTE — PN
DATE:  04/28/2019



SUBJECTIVE:  I saw her sitting out of bed to chair.  She has very much and

very poor right eye lateral vision loss.  It is very possible she had a

stroke.  There is an MRI of the brain pending.  It has been persisted for

more than 24 hours.  I discussed that with the patient.  She also is having

a balance problem and physical therapy recommended TCU.  She might need [facility].



PHYSICAL EXAMINATION:

VITAL SIGNS:  She has a 97.6 temperature, 71 pulse, 116/50 blood pressure,

18 respiratory rate, and 98% O2 sat.

HEENT:  Head is atraumatic and normocephalic.  Poor vision on the right

eye, cannot see anything laterally on the right.  Throat is moist.

NECK:  Supple.

HEART:  Regular rate and rhythm.

LUNGS:  Decreased breath sounds.

ABDOMEN:  Soft, obese, nontender.

EXTREMITIES:  Trace edema, if any.



MEDICATIONS:  She is currently on Amaryl, Antivert, Bactroban, Eliquis,

folic acid, Glucophage, aspirin, insulin, potassium replacement, Lasix,

Lovaza, Plavix, IV fluids, and Synthroid.  She was on Eliquis and Plavix

but still had this event.



LABORATORY DATA:  Sodium 140, potassium 3.5, replace the potassium, BUN 27,

creatinine 1.1, GFR is 48.  Sugar is 116, calcium is 9, total bili is 0.3,

AST is 18, ALT is 14, alk phos 61, total protein 6.8.  She has an 8 white

count, 8.8 hemoglobin, 27.7 hematocrit with 148 platelets.



ASSESSMENT AND PLAN:  She is being seen by Podiatry and Neurology.  Wait

for the MRI to come back.  I am hoping to get her to TCU tomorrow that is

what is recommended by physical therapy.  She might need [facility].  We

will see how she progresses, which is subacute rehab [facility]. We will

continue with aggressive treatment and care on Stella Ibanez with right

eye hemianopsia.  Await for eye doctor king.







__________________________________________

Guero Reilly DO





DD:  04/28/2019 11:14:32

DT:  04/28/2019 12:04:47

Job # [id number]

## 2019-04-29 LAB
ALBUMIN SERPL-MCNC: 3.6 {NULL, G/DL} (ref 3–4.8)
ALBUMIN/GLOB SERPL: 1.1 {NULL, NULL} (ref 1.1–1.8)
ALT SERPL-CCNC: 12 {NULL, U/L} (ref 7–56)
AST SERPL-CCNC: 21 {NULL, U/L} (ref 14–36)
BUN SERPL-MCNC: 28 {NULL, MG/DL} (ref 7–21)
CALCIUM SERPL-MCNC: 9.4 {NULL, MG/DL} (ref 8.4–10.5)
ERYTHROCYTE [DISTWIDTH] IN BLOOD BY AUTOMATED COUNT: 15.6 {NULL, %} (ref 11.5–14.5)
GFR NON-AFRICAN AMERICAN: 43 {NULL, NULL}
HGB BLD-MCNC: 8.9 {NULL, G/DL} (ref 12–16)
MCH RBC QN AUTO: 27.4 {NULL, PG} (ref 25–35)
MCHC RBC AUTO-ENTMCNC: 31.2 {NULL, G/DL} (ref 31–37)
MCV RBC AUTO: 87.7 {NULL, FL} (ref 80–105)
PLATELET # BLD: 167 {NULL, 10^3/UL} (ref 120–450)
PMV BLD AUTO: 9.8 {NULL, FL} (ref 7–11)
RBC # BLD AUTO: 3.25 {NULL, 10^6/UL} (ref 3.5–6.1)
WBC # BLD AUTO: 7.2 {NULL, 10^3/UL} (ref 4.5–11)

## 2019-04-29 RX ADMIN — INSULIN HUMAN SCH: 100 INJECTION, SOLUTION PARENTERAL at 12:39

## 2019-04-29 RX ADMIN — MUPIROCIN SCH: 20 OINTMENT TOPICAL at 09:44

## 2019-04-29 RX ADMIN — INSULIN HUMAN SCH: 100 INJECTION, SOLUTION PARENTERAL at 07:43

## 2019-04-29 RX ADMIN — OMEGA-3-ACID ETHYL ESTERS SCH GM: 900 CAPSULE, LIQUID FILLED ORAL at 17:59

## 2019-04-29 RX ADMIN — Medication SCH MG: at 09:47

## 2019-04-29 RX ADMIN — OMEGA-3-ACID ETHYL ESTERS SCH GM: 900 CAPSULE, LIQUID FILLED ORAL at 09:46

## 2019-04-29 RX ADMIN — Medication SCH MG: at 13:30

## 2019-04-29 RX ADMIN — POTASSIUM CHLORIDE SCH MEQ: 20 TABLET, EXTENDED RELEASE ORAL at 08:42

## 2019-04-29 RX ADMIN — INSULIN HUMAN SCH: 100 INJECTION, SOLUTION PARENTERAL at 22:09

## 2019-04-29 RX ADMIN — INSULIN HUMAN SCH UNITS: 100 INJECTION, SOLUTION PARENTERAL at 17:59

## 2019-04-29 RX ADMIN — Medication SCH MG: at 18:00

## 2019-04-29 RX ADMIN — AMOXICILLIN AND CLAVULANATE POTASSIUM SCH TAB: 500; 125 TABLET, FILM COATED ORAL at 17:58

## 2019-04-29 RX ADMIN — AMOXICILLIN AND CLAVULANATE POTASSIUM SCH TAB: 500; 125 TABLET, FILM COATED ORAL at 09:44

## 2019-04-29 NOTE — CP.PCM.PCO
Physician Communication Note





- Physician Communication Note


Physician Communication Note: rigth visual field cut sec to acute lft occpital 

infarct.c/w  plavix/eliqus

## 2019-04-29 NOTE — CP.PCM.PCO
Physician Communication Note





- Physician Communication Note


Physician Communication Note: replace magnesium, patient for ARIANE
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
## 2019-04-30 VITALS — TEMPERATURE: 98.1 F | HEART RATE: 83 BPM | RESPIRATION RATE: 19 BRPM

## 2019-04-30 VITALS — DIASTOLIC BLOOD PRESSURE: 73 MMHG | SYSTOLIC BLOOD PRESSURE: 120 MMHG

## 2019-04-30 VITALS — OXYGEN SATURATION: 96 %

## 2019-04-30 RX ADMIN — AMOXICILLIN AND CLAVULANATE POTASSIUM SCH TAB: 500; 125 TABLET, FILM COATED ORAL at 18:03

## 2019-04-30 RX ADMIN — INSULIN HUMAN SCH UNITS: 100 INJECTION, SOLUTION PARENTERAL at 18:03

## 2019-04-30 RX ADMIN — AMOXICILLIN AND CLAVULANATE POTASSIUM SCH TAB: 500; 125 TABLET, FILM COATED ORAL at 10:47

## 2019-04-30 RX ADMIN — OMEGA-3-ACID ETHYL ESTERS SCH GM: 900 CAPSULE, LIQUID FILLED ORAL at 18:03

## 2019-04-30 RX ADMIN — INSULIN HUMAN SCH UNITS: 100 INJECTION, SOLUTION PARENTERAL at 13:22

## 2019-04-30 RX ADMIN — Medication SCH MG: at 10:45

## 2019-04-30 RX ADMIN — OMEGA-3-ACID ETHYL ESTERS SCH GM: 900 CAPSULE, LIQUID FILLED ORAL at 10:47

## 2019-04-30 RX ADMIN — Medication SCH MG: at 18:04

## 2019-04-30 RX ADMIN — Medication SCH: at 14:00

## 2019-04-30 RX ADMIN — INSULIN HUMAN SCH UNITS: 100 INJECTION, SOLUTION PARENTERAL at 18:02

## 2019-04-30 RX ADMIN — INSULIN HUMAN SCH: 100 INJECTION, SOLUTION PARENTERAL at 07:36

## 2019-04-30 RX ADMIN — POTASSIUM CHLORIDE SCH MEQ: 20 TABLET, EXTENDED RELEASE ORAL at 08:46

## 2019-04-30 NOTE — CP.PCM.PCO
Physician Communication Note





- Physician Communication Note


Physician Communication Note: MRI of foot pending, ARIANE recommended

## 2019-04-30 NOTE — MRI
Date of service: 



04/30/2019



PROCEDURE:  MRI of the left foot



HISTORY:

left second digit chronic ulceration



COMPARISON:





TECHNIQUE:

MRI of the left foot was performed in multiple planes using multiple 

pulse sequences.



FINDINGS:

There is no marrow edema seen to suggest osteomyelitis.  Specifically 

there are no findings seen in the 2nd digit.



There is no significant cellulitis.  There is no evidence of soft 

tissue abscess.



IMPRESSION:

No evidence of osteomyelitis
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
## 2019-04-30 NOTE — DS
HISTORY OF PRESENT ILLNESS:  She came in with a right hemianopsia.  She is

comfortable.  I am hoping to get her to [facility] for physical therapy. 

She is on Amaryl, Antivert, Augmentin, Bactroban, Ecotrin, Eliquis, folic

acid, Glucophage, insulin, potassium, Lasix, Lovaza, Neurontin, Plavix, IV

fluids, and Synthroid.



PHYSICAL EXAMINATION:

VITAL SIGNS:  She has a 97.8 temperature, 66 pulse, 110/65 blood pressure,

20 respiratory rate, and 98% O2 sat on room air.

HEENT:  Head is atraumatic and normocephalic.  She has no right-sided

peripheral vision.  Throat is moist.

NECK:  Supple.

HEART:  Regular rate.

LUNGS:  Decreased breath sounds.

ABDOMEN:  Soft and obese.

EXTREMITIES:  There is a toe ulcer, it is improving.  She is feeling

better.



LABORATORY DATA:  She has a 7.2 white count, 8.9 hemoglobin, 28.5

hematocrit with a 167 platelets.  Sodium 140, potassium 4, BUN 28,

creatinine 1.2, GFR is 43, sugar is 116, calcium is 9.4, total bilirubin is

0.3.  AST is 21, ALT of 12, alkaline phosphatase is 63, total protein is 7.

She had an MRI of the brain on the 04/27/2019; there was acute infarct left

occipital lobe, chronic appearing infarcts right posterior temporal

parietal infarct.



ASSESSMENT AND PLAN:  She has balance issues.  I am going to get her to

stay at [facility] for physical therapy before she goes home.  She is here

with a new cerebrovascular accident, left occipital pole, right-sided

lateral, vision loss and needs physical therapy.  She wants to go to [facility], I am hoping to get her to [facility].







__________________________________________

Guero Reilly DO





DD:  04/29/2019 8:45:57

DT:  04/29/2019 9:41:31

Job # [id number]

## 2019-05-01 NOTE — DS
HISTORY OF PRESENT ILLNESS:   her yesterday, I am hoping to get her to

[facility] for subacute rehab.  We will see how the insurance approves

this.  She is comfortable in bed, motivated to go to [facility], wants

physical therapy.  She is in fairly good spirits this morning.  She

is eating okay.



PHYSICAL EXAMINATION:

VITAL SIGNS:  She has a 97.8 temperature, 88 pulse, 106/57 blood pressure,

18 respiratory rate, and 96% O2 sat on room air.

HEENT:  Head; atraumatic and normocephalic.

HEART:  Regular rate.

LUNGS:  Decreased breath sounds, but clear.

ABDOMEN:  Soft, obese, and nontender.

EXTREMITIES:  Trace edema.  Also she has a left second toe ulcer on top

which looks like it is noninfected with a little scab on top of it.



ASSESSMENT AND PLAN:  She does have a right hemianopsia from

cerebrovascular accident in the left occipital area and she can use some

physical therapy for her balance and her vision issues.  Hopefully, she is

going to go there today.







__________________________________________

Guero Reilly DO





DD:  04/30/2019 8:55:24

DT:  04/30/2019 9:00:05

Job # [id number]

MTDD

## 2023-08-22 ENCOUNTER — NEW PATIENT COMPREHENSIVE (OUTPATIENT)
Dept: URBAN - METROPOLITAN AREA CLINIC 21 | Facility: CLINIC | Age: 88
End: 2023-08-22

## 2023-08-22 DIAGNOSIS — H43.811: ICD-10-CM

## 2023-08-22 DIAGNOSIS — H25.12: ICD-10-CM

## 2023-08-22 DIAGNOSIS — Z96.1: ICD-10-CM

## 2023-08-22 DIAGNOSIS — H40.023: ICD-10-CM

## 2023-08-22 DIAGNOSIS — H54.7: ICD-10-CM

## 2023-08-22 DIAGNOSIS — E11.9: ICD-10-CM

## 2023-08-22 PROCEDURE — 92133 CPTRZD OPH DX IMG PST SGM ON: CPT

## 2023-08-22 PROCEDURE — 76514 ECHO EXAM OF EYE THICKNESS: CPT

## 2023-08-22 PROCEDURE — 92004 COMPRE OPH EXAM NEW PT 1/>: CPT

## 2023-08-22 ASSESSMENT — VISUAL ACUITY
OS_SC: J16-2
OD_SC: CF 4FT
OD_SC: J16
OS_SC: 20/400

## 2023-08-22 ASSESSMENT — TONOMETRY
OD_IOP_MMHG: 17
OS_IOP_MMHG: 14

## 2023-08-22 ASSESSMENT — PACHYMETRY
OD_CT_UM: 589
OS_CT_UM: 587

## 2024-12-16 NOTE — CT
PROCEDURE:  CT Abdomen and Pelvis without intravenous contrast



HISTORY:

elevated lactate



COMPARISON:

Comparison is made to the previous study dated 06/27/2017



TECHNIQUE:

Axial and reformatted coronal and sagittal CT images of the abdomen 

and pelvis were obtained without IV or oral contrast administration.. 



Contrast Dose: 0



Radiation dose:



Total exam DLP = 1098.03 mGy-cm.



This CT exam was performed using one or more of the following dose 

reduction techniques: Automated exposure control, adjustment of the 

mA and/or kV according to patient size, and/or use of iterative 

reconstruction technique.



FINDINGS:



LOWER THORAX:

No evidence of acute pathology at the lung bases. The heart is mildly 

enlarged. 



LIVER:

Unremarkable. No gross lesion or ductal dilatation.  



GALLBLADDER AND BILE DUCTS:

Status post cholecystectomy. 



PANCREAS:

Diffuse fatty replacement of the pancreas is again noted. The main 

pancreatic duct is not dilated.



SPLEEN:

Unremarkable. 



ADRENALS:

Unremarkable. No mass. 



KIDNEYS AND URETERS:

Unremarkable. No hydronephrosis. No solid mass. 



VASCULATURE:

Unremarkable. No aortic aneurysm. 



BOWEL:

Unremarkable. No obstruction. No gross mural thickening. Scattered 

colonic diverticulosis are again seen without evidence of 

diverticulitis.



APPENDIX:

No evidence of appendicitis. 



PERITONEUM:

Unremarkable. No free fluid. No free air. 



LYMPH NODES:

Unremarkable. No enlarged lymph nodes. 



BLADDER:

The urinary bladder is mildly to moderately distended. 



REPRODUCTIVE:

Unremarkable. 



BONES:

No acute fracture. 



OTHER FINDINGS:

Postsurgical changes are noted at the anterior abdominal wall.



IMPRESSION:

No evidence of acute pathology in the abdomen and pelvis.  No 

evidence of significant interval change compared to the previous 

study. headache/Right: